# Patient Record
Sex: FEMALE | Race: OTHER | NOT HISPANIC OR LATINO | URBAN - METROPOLITAN AREA
[De-identification: names, ages, dates, MRNs, and addresses within clinical notes are randomized per-mention and may not be internally consistent; named-entity substitution may affect disease eponyms.]

---

## 2017-01-03 ENCOUNTER — EMERGENCY (EMERGENCY)
Facility: HOSPITAL | Age: 52
LOS: 1 days | Discharge: HOME | End: 2017-01-03
Admitting: EMERGENCY MEDICINE
Payer: MEDICAID

## 2017-01-03 VITALS
OXYGEN SATURATION: 100 % | SYSTOLIC BLOOD PRESSURE: 98 MMHG | HEART RATE: 80 BPM | TEMPERATURE: 98 F | RESPIRATION RATE: 16 BRPM | DIASTOLIC BLOOD PRESSURE: 58 MMHG

## 2017-01-03 VITALS
OXYGEN SATURATION: 99 % | HEART RATE: 89 BPM | TEMPERATURE: 98 F | SYSTOLIC BLOOD PRESSURE: 95 MMHG | RESPIRATION RATE: 18 BRPM | DIASTOLIC BLOOD PRESSURE: 63 MMHG

## 2017-01-03 DIAGNOSIS — F43.24 ADJUSTMENT DISORDER WITH DISTURBANCE OF CONDUCT: ICD-10-CM

## 2017-01-03 DIAGNOSIS — F33.40 MAJOR DEPRESSIVE DISORDER, RECURRENT, IN REMISSION, UNSPECIFIED: ICD-10-CM

## 2017-01-03 DIAGNOSIS — F41.9 ANXIETY DISORDER, UNSPECIFIED: ICD-10-CM

## 2017-01-03 DIAGNOSIS — Z98.89 OTHER SPECIFIED POSTPROCEDURAL STATES: Chronic | ICD-10-CM

## 2017-01-03 DIAGNOSIS — R69 ILLNESS, UNSPECIFIED: ICD-10-CM

## 2017-01-03 PROCEDURE — 90792 PSYCH DIAG EVAL W/MED SRVCS: CPT

## 2017-01-03 PROCEDURE — 99284 EMERGENCY DEPT VISIT MOD MDM: CPT

## 2017-01-03 RX ORDER — IBUPROFEN 200 MG
600 TABLET ORAL ONCE
Qty: 0 | Refills: 0 | Status: COMPLETED | OUTPATIENT
Start: 2017-01-03 | End: 2017-01-03

## 2017-01-03 RX ADMIN — Medication 600 MILLIGRAM(S): at 14:30

## 2017-01-03 NOTE — ED PROVIDER NOTE - OBJECTIVE STATEMENT
This is a 51 year old Female BIBA from Dentist office for psych eval r/t increased agitation. Patient reports jaw pain since Saturday treating with Tylenol and Motrin and went to the dentist office and got upset in which they sent here. Requesting Toradol and morphine for pain.  Reports she has an infection and needs fluids and blood drawn,  Reports thyroid biopsy two weeks ago. results benign. Denies chest pain, SOB, N/V/D and fevers, Denies palpitations or diaphoresis. Denies Numbness, Tingling, Blurry Vision and HA.  Denies suicidal/homicidal thoughts. Denies visual/auditory hallucinations. Denies ETOH/Illicit drug use. Denies recent falls, trauma and injuries. Denies pain or any other medical complaints.

## 2017-01-03 NOTE — ED ADULT NURSE NOTE - PSH
History of tonsillectomy    S/P breast lumpectomy  L breast with lymph node removal  S/P hysterectomy

## 2017-01-03 NOTE — ED BEHAVIORAL HEALTH NOTE - BEHAVIORAL HEALTH NOTE
Pt requested team call her Dentist, Dr. Mcneil [phone] for collateral as pt was sent from Dentist's office. SW called Dr. Mcneil's office and there was no answer. LISSETTE then called pt's PMD Dr. Bloom [phone] for additional information. Dr. Bloom stated that pt has been under his care for the past 10 years. Dr. Bloom stated that pt refuses to see Psychiatrist and therefore he prescribes her medications of Klonopin 1mg QD, and Paxil 30mg QD. Dr. Bloom stated that pt has many psychiatric issues, though he is unaware of any past psychiatric hospitalizations or suicide attempts/suicidality. Dr. Bloom stated that pt's baseline is highly anxious with odd behaviors, though no examples were provided, and stated pt may have unspecified psychotic d/o, though he stated he is unsure of additional dx. Per Dr. Bloom, pt resides in supportive housing, though he is unsure of location. Dr. Bloom reported that pt currently has dx of breast ca though pt has refused tx.     LISSETTE spoke to treating NP Olivia Moreira who stated that pt is now stable for d/c and requires transportation home, pt reportedly safe to travel via [employer] service. LISSETTE called "[employer]  and arranged transportation via [employer] [facility], trip #[id number] ETA 515pm.

## 2017-01-03 NOTE — ED PROVIDER NOTE - PROGRESS NOTE DETAILS
dental called for consultation.  Per dental patient has a craked tooth Patient to follow up with dental outpatient no further reccomendations at this time.

## 2017-01-03 NOTE — ED PROVIDER NOTE - MEDICAL DECISION MAKING DETAILS
This is a 51 year old Female BIBA from Dentist office for psych eval r/t increased agitation.  Medical evaluation performed. There is no clinical evidence of intoxication or any acute medical problem requiring immediate intervention. Final disposition will be determined by psychiatrist. This is a 51 year old Female BIBA from Dentist office for psych eval r/t increased agitation.  Medical evaluation performed. There is no clinical evidence of intoxication or any acute medical problem requiring immediate intervention. Final disposition will be determined by psychiatrist. Istop # [id number] no data Dental  consult revealed cracked tooth patient to follow up outpatient no recommendations

## 2017-01-03 NOTE — ED PROVIDER NOTE - PHYSICAL EXAMINATION
VSS DENIES PAIN SOB N/V NO S/S OF CARDIAC OR RESPIRATORY DISTRESS PT AMBULATES AND PERFORMS ADL'S INDEPENDENTLY TOLERATING PO INTAKE

## 2017-01-03 NOTE — ED ADULT NURSE NOTE - OBJECTIVE STATEMENT
Patient presented to ED with c/o tooth pain and gum infection, states she went to see dentist, and wanted her regular dentist, she was not pleased and became agitated in dental office, and sent to  ED for evaluation.  Patient admits to depression hx and takes Prozac daily and compliant.  Calm and cooperative.  Will continue to monitor patient closely.  Mary Jo VILLATORO (meal relief)

## 2017-01-03 NOTE — ED BEHAVIORAL HEALTH ASSESSMENT NOTE - SUMMARY
Patient is a 51 year old single unemployed non-caregiver  female currently residing in a private residence alone with HHA 2x weekly. PPH per patient MDD & Anxiety. She denies history of inpatient admissions. She has a history of 1 past suicide attempts in 2013. She denies history of violence, aggression, legal issues or substance abuse problems. PMH includes- Breast Cancer BRCA-1 in RM, Epilepsy, COPD, Pre-diabetic, migraines, bilateral calcification of kidneys and asthma. BIBA for agitation at dentists office.    Patient reports she came to the ER after she became upset when her oral surgeon was not available today so the office referred her to the ER. Patient endorses tooth pain since Saturday and is requesting a dentist. Patient was calm and cooperative during evaluation. She presented with a  linear thought process and organized. She denied SI/HI/SIB/intent/plan, depression, manic symptoms, psychotic symptoms or any other mental health issues. She was future oriented and able to safety plan. She is not seeking inpatient psychiatric admission and is requesting discharge home and to see a dentist. Patient does not present an imminent risk to self or others and does not meet criteria for involuntary inpatient admission. Recommend discharge home. Patient refused referral for psychiatric treatment stating she wants to continue to see her PCP. Extensive safety planning performed. Patient agreeing verbally to return patient to ER or call 911 if symptoms worsen or patient has urges to harm self or others.

## 2017-01-03 NOTE — ED PROVIDER NOTE - ENMT, MLM
Airway patent, Nasal mucosa clear. Mouth with normal mucosa. Throat has no vesicles, no oropharyngeal exudates and uvula is midline. Airway patent, Nasal mucosa clear. Mouth with normal mucosa. Throat has no vesicles, no oropharyngeal exudates and uvula is midline. No pain on palpation to mandible No erythema No pharyngeal irritation. No loose teeth No ulcers upon gum line noted.

## 2017-01-03 NOTE — ED BEHAVIORAL HEALTH ASSESSMENT NOTE - AXIS III
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

## 2017-01-03 NOTE — ED BEHAVIORAL HEALTH ASSESSMENT NOTE - DESCRIPTION
During course of hospitalization patient was calm and cooperative. Patient was not aggressive or violent and did not require PRN medications. Breast Cancer BRACA-1 in RM, Epilepsy, COPD, Pre-diabetic, migraines, bilateral calcification of kidneys and asthma. see hpi Breast Cancer BRCA-1 in RM, Epilepsy, COPD, Pre-diabetic, migraines, bilateral calcification of kidneys and asthma.

## 2017-01-03 NOTE — ED BEHAVIORAL HEALTH ASSESSMENT NOTE - HPI (INCLUDE ILLNESS QUALITY, SEVERITY, DURATION, TIMING, CONTEXT, MODIFYING FACTORS, ASSOCIATED SIGNS AND SYMPTOMS)
Patient is a Patient is a 51 year old single unemployed non-caregiver  female currently residing in a private residence alone with HHA 2x weekly. PPH per patient MDD & Anxiety. She denies history of inpatient admissions. She has a history of 1 past suicide attempts in 2013. She denies history of violence, aggression, legal issues or substance abuse problems. PMH includes- Breast Cancer BRACA-1 in , Epilepsy, COPD, Pre-diabetic, migraines, bilateral calcification of kidneys and asthma. BIBA for agitation at dentists office.    Patient reports she came to the ER because her oral surgeon wasn't in the office today and she became upset when the office suggested she come to the ER. Patient reports she has been having tooth pain since Saturday and thinks she needs a tooth pulled. She stated when she found out her oral surgeon wasn't in the office the office did not give her another MD to see and just suggested she come to the ER. She didn't want to go to the ER so she began yelling and they called 911.    Patient endorses chronic issues with fatigue and sleep since 2014 when she was diagnosed with breast cancer. She denies worsening of symptoms. She denies SI/HI/SIB/intent/plan, depression, anxiety, panic attacks, hypomanic or manic symptoms, hopelessness, worthlessness, drug use, AH/VH, paranoia, IOR or any other mental health issues.     See  note for collateral information. Patient is a 51 year old single unemployed non-caregiver  female currently residing in a private residence alone with HHA 2x weekly. PPH per patient MDD & Anxiety. She denies history of inpatient admissions. She has a history of 1 past suicide attempts in 2013. She denies history of violence, aggression, legal issues or substance abuse problems. PMH includes- Breast Cancer BRCA-1 in RM, Epilepsy, COPD, Pre-diabetic, migraines, bilateral calcification of kidneys and asthma. BIBA for agitation at dentists office.    Patient reports she came to the ER because her oral surgeon wasn't in the office today and she became upset when the office suggested she come to the ER. Patient reports she has been having tooth pain since Saturday and thinks she needs a tooth pulled. She stated when she found out her oral surgeon wasn't in the office the office did not give her another MD to see and just suggested she come to the ER. She didn't want to go to the ER so she began yelling and they called 911.    Patient endorses chronic issues with fatigue and sleep since 2014 when she was diagnosed with breast cancer. She denies worsening of symptoms. She denies SI/HI/SIB/intent/plan, depression, anxiety, panic attacks, hypomanic or manic symptoms, hopelessness, worthlessness, drug use, AH/VH, paranoia, IOR or any other mental health issues.     See  note for collateral information. Patient reports she had no other collateral to contact.

## 2017-01-03 NOTE — ED BEHAVIORAL HEALTH ASSESSMENT NOTE - SUICIDE PROTECTIVE FACTORS
Positive therapeutic relationships/Future oriented/Identifies reasons for living/Responsibility to family and others

## 2017-01-03 NOTE — ED BEHAVIORAL HEALTH ASSESSMENT NOTE - OTHER PAST PSYCHIATRIC HISTORY (INCLUDE DETAILS REGARDING ONSET, COURSE OF ILLNESS, INPATIENT/OUTPATIENT TREATMENT)
PPH per patient MDD & Anxiety. She denies history of inpatient admissions. She has a history of 1 past suicide attempts in 2013. Sees Internist Dr. oRbin Bloom DO for medication.

## 2017-01-03 NOTE — ED ADULT NURSE NOTE - PMH
Anxiety    Breast cancer  s/p radiation and chemo in 4/2015  COPD (chronic obstructive pulmonary disease)    Depression    Epilepsy    GERD (gastroesophageal reflux disease)

## 2017-01-03 NOTE — ED BEHAVIORAL HEALTH ASSESSMENT NOTE - CASE SUMMARY
51 year old F with history of anxiety/depression with 1 past suicide attempt who had tooth pain.  When her dentist was not there today, she caused a scene due to her behavior.  She was de-escalated by the time she arrived in ED.  She is without any SI or aggression in ED that would necessitate inpatient admission.  She does appear to be somewhat disorganized and possibly has an underlying psychotic or bipolar spectrum disorder.  She would certainly benefit from an outpt provider but refused a referral.  She will continue to follow-up with PCP and her  at [facility].

## 2017-01-03 NOTE — ED BEHAVIORAL HEALTH ASSESSMENT NOTE - RISK ASSESSMENT
patient does not present an imminent risk to self or others as evidence by no suicidal thoughts/intent/plan, no homicidal thoughts, no SIB, no depression, no reena, no psychosis, no substance abuse, medication compliant, no trauma, future oriented, able to safety plan, no anxiety, no panic attacks, no aggression/violence, no legal issues and calm/cooperative throughout evaluation.     chronic risk factors- chronic issues with insomnia and fatigue since 2014, history of suicide attempts     risk factors- acute tooth pain

## 2017-01-03 NOTE — ED BEHAVIORAL HEALTH ASSESSMENT NOTE - SAFETY PLAN DETAILS
Extensive safety planning performed. Patient agreeing verbally to return patient to ER or call 911 if symptoms worsen or patient has urges to harm self or others.

## 2017-01-03 NOTE — ED ADULT TRIAGE NOTE - CHIEF COMPLAINT QUOTE
Patient brought to ER from dentist by EMS because she got upset and was acting loud and irrational because her doctor was not there. But her chief complaint is tooth pain.

## 2017-01-05 ENCOUNTER — EMERGENCY (EMERGENCY)
Facility: HOSPITAL | Age: 52
LOS: 1 days | Discharge: HOME | End: 2017-01-05
Attending: EMERGENCY MEDICINE | Admitting: EMERGENCY MEDICINE
Payer: MEDICAID

## 2017-01-05 VITALS
OXYGEN SATURATION: 97 % | SYSTOLIC BLOOD PRESSURE: 99 MMHG | HEART RATE: 86 BPM | DIASTOLIC BLOOD PRESSURE: 76 MMHG | RESPIRATION RATE: 19 BRPM

## 2017-01-05 DIAGNOSIS — Z98.89 OTHER SPECIFIED POSTPROCEDURAL STATES: Chronic | ICD-10-CM

## 2017-01-05 PROCEDURE — 99283 EMERGENCY DEPT VISIT LOW MDM: CPT

## 2017-01-05 RX ORDER — ONDANSETRON 8 MG/1
4 TABLET, FILM COATED ORAL ONCE
Qty: 0 | Refills: 0 | Status: COMPLETED | OUTPATIENT
Start: 2017-01-05 | End: 2017-01-05

## 2017-01-05 RX ORDER — IBUPROFEN 200 MG
1 TABLET ORAL
Qty: 16 | Refills: 0 | OUTPATIENT
Start: 2017-01-05 | End: 2017-01-09

## 2017-01-05 RX ADMIN — ONDANSETRON 4 MILLIGRAM(S): 8 TABLET, FILM COATED ORAL at 11:36

## 2017-01-05 RX ADMIN — ONDANSETRON 4 MILLIGRAM(S): 8 TABLET, FILM COATED ORAL at 11:37

## 2017-01-05 NOTE — ED ADULT TRIAGE NOTE - CHIEF COMPLAINT QUOTE
Patient brought to ER from home for c/o dental pain and lethargic. Pt was here the other day for psych and said she was supposed to be seen by medical.

## 2017-01-05 NOTE — ED PROVIDER NOTE - OBJECTIVE STATEMENT
50 y/o F with PMHx of COPD, epilepsy, breast cancer s/p radiation and chemotherapy in 2015, presents to the ED via EMS c/o dental pain x5 days. Pt was seen here 1/3/2017 for similar symptoms, was sent here from dental office due to increased agitation in the office. Pt was seen in  before she was discharged, and she is here today for tooth pain to right lower molar, lethargy. Also complaining of increased bowel movements after she eats x3 days, nausea. Denies chest pain, shortness of breath.

## 2017-01-05 NOTE — ED PROVIDER NOTE - MEDICAL DECISION MAKING DETAILS
50 y/o F with dental pain. Plan: dental consult. 50 y/o F with dental pain. Plan: dental consult. refer to progress note

## 2017-01-05 NOTE — PROVIDER CONTACT NOTE (OTHER) - ASSESSMENT
pt stable to return home with her HHA.  [employer] [facility] called and transport set up for [employer] at 1:30pm. ref# [id number] pt stable to return home with her HHA.  [facility]. ref# [id number]

## 2017-01-05 NOTE — ED PROVIDER NOTE - NS ED MD SCRIBE ATTENDING SCRIBE SECTIONS
HISTORY OF PRESENT ILLNESS/REVIEW OF SYSTEMS/PAST MEDICAL/SURGICAL/SOCIAL HISTORY/DISPOSITION/HIV/VITAL SIGNS( Pullset)/PHYSICAL EXAM

## 2017-01-05 NOTE — ED PROVIDER NOTE - PROGRESS NOTE DETAILS
took over care from Dr. Han; pt primary complaint is pericorinitis; dental resident spoke to pt via phone (she had seen here on thursday) and given phone number to call for follow up; sent rx for pain meds and abx; pt seems content with this plan; has had mild diarrhea but no abdominal pain on exam; will d/c home

## 2017-04-27 ENCOUNTER — OUTPATIENT (OUTPATIENT)
Dept: OUTPATIENT SERVICES | Facility: HOSPITAL | Age: 52
LOS: 1 days | Discharge: HOME | End: 2017-04-27

## 2017-04-27 DIAGNOSIS — Z98.89 OTHER SPECIFIED POSTPROCEDURAL STATES: Chronic | ICD-10-CM

## 2017-06-28 DIAGNOSIS — R31.9 HEMATURIA, UNSPECIFIED: ICD-10-CM

## 2017-10-02 ENCOUNTER — EMERGENCY (EMERGENCY)
Facility: HOSPITAL | Age: 52
LOS: 1 days | End: 2017-10-02
Attending: EMERGENCY MEDICINE | Admitting: EMERGENCY MEDICINE
Payer: MEDICAID

## 2017-10-02 ENCOUNTER — EMERGENCY (EMERGENCY)
Facility: HOSPITAL | Age: 52
LOS: 1 days | Discharge: HOME | End: 2017-10-02
Attending: EMERGENCY MEDICINE | Admitting: EMERGENCY MEDICINE
Payer: MEDICAID

## 2017-10-02 VITALS
HEART RATE: 70 BPM | OXYGEN SATURATION: 96 % | SYSTOLIC BLOOD PRESSURE: 117 MMHG | DIASTOLIC BLOOD PRESSURE: 66 MMHG | RESPIRATION RATE: 18 BRPM

## 2017-10-02 VITALS
DIASTOLIC BLOOD PRESSURE: 59 MMHG | OXYGEN SATURATION: 100 % | SYSTOLIC BLOOD PRESSURE: 109 MMHG | TEMPERATURE: 98 F | RESPIRATION RATE: 20 BRPM | HEART RATE: 78 BPM

## 2017-10-02 VITALS
HEART RATE: 76 BPM | TEMPERATURE: 98 F | DIASTOLIC BLOOD PRESSURE: 60 MMHG | OXYGEN SATURATION: 100 % | SYSTOLIC BLOOD PRESSURE: 96 MMHG | RESPIRATION RATE: 18 BRPM

## 2017-10-02 DIAGNOSIS — Z98.89 OTHER SPECIFIED POSTPROCEDURAL STATES: Chronic | ICD-10-CM

## 2017-10-02 DIAGNOSIS — F41.1 GENERALIZED ANXIETY DISORDER: ICD-10-CM

## 2017-10-02 LAB
ALBUMIN SERPL ELPH-MCNC: 4 G/DL — SIGNIFICANT CHANGE UP (ref 3.3–5)
ALP SERPL-CCNC: 63 U/L — SIGNIFICANT CHANGE UP (ref 40–120)
ALT FLD-CCNC: 12 U/L RC — SIGNIFICANT CHANGE UP (ref 10–45)
ANION GAP SERPL CALC-SCNC: 14 MMOL/L — SIGNIFICANT CHANGE UP (ref 5–17)
APAP SERPL-MCNC: <15 UG/ML — SIGNIFICANT CHANGE UP (ref 10–30)
AST SERPL-CCNC: 17 U/L — SIGNIFICANT CHANGE UP (ref 10–40)
BASOPHILS # BLD AUTO: 0 K/UL — SIGNIFICANT CHANGE UP (ref 0–0.2)
BASOPHILS NFR BLD AUTO: 0.1 % — SIGNIFICANT CHANGE UP (ref 0–2)
BILIRUB SERPL-MCNC: 0.3 MG/DL — SIGNIFICANT CHANGE UP (ref 0.2–1.2)
BUN SERPL-MCNC: 23 MG/DL — SIGNIFICANT CHANGE UP (ref 7–23)
CALCIUM SERPL-MCNC: 9 MG/DL — SIGNIFICANT CHANGE UP (ref 8.4–10.5)
CHLORIDE SERPL-SCNC: 108 MMOL/L — SIGNIFICANT CHANGE UP (ref 96–108)
CO2 SERPL-SCNC: 19 MMOL/L — LOW (ref 22–31)
CREAT SERPL-MCNC: 0.87 MG/DL — SIGNIFICANT CHANGE UP (ref 0.5–1.3)
EOSINOPHIL # BLD AUTO: 0.1 K/UL — SIGNIFICANT CHANGE UP (ref 0–0.5)
EOSINOPHIL NFR BLD AUTO: 1.8 % — SIGNIFICANT CHANGE UP (ref 0–6)
GAS PNL BLDV: SIGNIFICANT CHANGE UP
GGT SERPL-CCNC: 44 U/L — HIGH (ref 8–40)
GLUCOSE SERPL-MCNC: 176 MG/DL — HIGH (ref 70–99)
HCG SERPL-ACNC: <2 MIU/ML — SIGNIFICANT CHANGE UP
HCT VFR BLD CALC: 43.7 % — SIGNIFICANT CHANGE UP (ref 34.5–45)
HGB BLD-MCNC: 14.6 G/DL — SIGNIFICANT CHANGE UP (ref 11.5–15.5)
LYMPHOCYTES # BLD AUTO: 1.2 K/UL — SIGNIFICANT CHANGE UP (ref 1–3.3)
LYMPHOCYTES # BLD AUTO: 19.1 % — SIGNIFICANT CHANGE UP (ref 13–44)
MCHC RBC-ENTMCNC: 32.9 PG — SIGNIFICANT CHANGE UP (ref 27–34)
MCHC RBC-ENTMCNC: 33.3 GM/DL — SIGNIFICANT CHANGE UP (ref 32–36)
MCV RBC AUTO: 98.9 FL — SIGNIFICANT CHANGE UP (ref 80–100)
MONOCYTES # BLD AUTO: 0.4 K/UL — SIGNIFICANT CHANGE UP (ref 0–0.9)
MONOCYTES NFR BLD AUTO: 5.5 % — SIGNIFICANT CHANGE UP (ref 2–14)
NEUTROPHILS # BLD AUTO: 4.7 K/UL — SIGNIFICANT CHANGE UP (ref 1.8–7.4)
NEUTROPHILS NFR BLD AUTO: 73.4 % — SIGNIFICANT CHANGE UP (ref 43–77)
PLATELET # BLD AUTO: 284 K/UL — SIGNIFICANT CHANGE UP (ref 150–400)
POTASSIUM SERPL-MCNC: 3.3 MMOL/L — LOW (ref 3.5–5.3)
POTASSIUM SERPL-SCNC: 3.3 MMOL/L — LOW (ref 3.5–5.3)
PROT SERPL-MCNC: 6.9 G/DL — SIGNIFICANT CHANGE UP (ref 6–8.3)
RBC # BLD: 4.42 M/UL — SIGNIFICANT CHANGE UP (ref 3.8–5.2)
RBC # FLD: 12.1 % — SIGNIFICANT CHANGE UP (ref 10.3–14.5)
SALICYLATES SERPL-MCNC: <2 MG/DL — LOW (ref 15–30)
SODIUM SERPL-SCNC: 141 MMOL/L — SIGNIFICANT CHANGE UP (ref 135–145)
TSH SERPL-MCNC: 1.22 UIU/ML — SIGNIFICANT CHANGE UP (ref 0.27–4.2)
WBC # BLD: 6.4 K/UL — SIGNIFICANT CHANGE UP (ref 3.8–10.5)
WBC # FLD AUTO: 6.4 K/UL — SIGNIFICANT CHANGE UP (ref 3.8–10.5)

## 2017-10-02 PROCEDURE — 82435 ASSAY OF BLOOD CHLORIDE: CPT

## 2017-10-02 PROCEDURE — 99284 EMERGENCY DEPT VISIT MOD MDM: CPT

## 2017-10-02 PROCEDURE — 84443 ASSAY THYROID STIM HORMONE: CPT

## 2017-10-02 PROCEDURE — 93010 ELECTROCARDIOGRAM REPORT: CPT

## 2017-10-02 PROCEDURE — 99285 EMERGENCY DEPT VISIT HI MDM: CPT | Mod: 25

## 2017-10-02 PROCEDURE — 82330 ASSAY OF CALCIUM: CPT

## 2017-10-02 PROCEDURE — 84132 ASSAY OF SERUM POTASSIUM: CPT

## 2017-10-02 PROCEDURE — 90792 PSYCH DIAG EVAL W/MED SRVCS: CPT

## 2017-10-02 PROCEDURE — 84702 CHORIONIC GONADOTROPIN TEST: CPT

## 2017-10-02 PROCEDURE — 93005 ELECTROCARDIOGRAM TRACING: CPT

## 2017-10-02 PROCEDURE — 82977 ASSAY OF GGT: CPT

## 2017-10-02 PROCEDURE — 83605 ASSAY OF LACTIC ACID: CPT

## 2017-10-02 PROCEDURE — 71045 X-RAY EXAM CHEST 1 VIEW: CPT

## 2017-10-02 PROCEDURE — 71010: CPT | Mod: 26

## 2017-10-02 PROCEDURE — 99284 EMERGENCY DEPT VISIT MOD MDM: CPT | Mod: 25

## 2017-10-02 PROCEDURE — 80307 DRUG TEST PRSMV CHEM ANLYZR: CPT

## 2017-10-02 PROCEDURE — 84295 ASSAY OF SERUM SODIUM: CPT

## 2017-10-02 PROCEDURE — 85027 COMPLETE CBC AUTOMATED: CPT

## 2017-10-02 PROCEDURE — 85014 HEMATOCRIT: CPT

## 2017-10-02 PROCEDURE — 80053 COMPREHEN METABOLIC PANEL: CPT

## 2017-10-02 PROCEDURE — 82947 ASSAY GLUCOSE BLOOD QUANT: CPT

## 2017-10-02 PROCEDURE — 82803 BLOOD GASES ANY COMBINATION: CPT

## 2017-10-02 RX ORDER — SODIUM CHLORIDE 9 MG/ML
1000 INJECTION INTRAMUSCULAR; INTRAVENOUS; SUBCUTANEOUS ONCE
Qty: 0 | Refills: 0 | Status: COMPLETED | OUTPATIENT
Start: 2017-10-02 | End: 2017-10-02

## 2017-10-02 RX ORDER — ACETAMINOPHEN 500 MG
650 TABLET ORAL ONCE
Qty: 0 | Refills: 0 | Status: COMPLETED | OUTPATIENT
Start: 2017-10-02 | End: 2017-10-02

## 2017-10-02 RX ADMIN — Medication 1 MILLIGRAM(S): at 13:26

## 2017-10-02 RX ADMIN — SODIUM CHLORIDE 33.33 MILLILITER(S): 9 INJECTION INTRAMUSCULAR; INTRAVENOUS; SUBCUTANEOUS at 08:52

## 2017-10-02 NOTE — ED BEHAVIORAL HEALTH ASSESSMENT NOTE - DESCRIPTION
Unremarkable. Patient seen comfortably resting on the bed in no visible distress. PMHx of seizures and breast cancer (BRCA 1 positive) Domiciled, unmarried
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
## 2017-10-02 NOTE — ED PROVIDER NOTE - PHYSICAL EXAMINATION
Physical Exam: middle aged F who is disheveled, anxious-appearing, AAO, NCAT, MMM, neck is supple, PERRL, CTAB, normal rate and regular rhythm, abdomen is soft and NTND, No edema, No deformity of extremities, No rashes, CN grossly intact, No focal motor or sensory deficits. ~ Azar Ordonez MD

## 2017-10-02 NOTE — ED PROVIDER NOTE - OBJECTIVE STATEMENT
51yo woman PMH breast CA stage 2 dx in 2014, BRCA 1 s/p chemo/radi in 2015, epilepsy last seizure "years ago", COPD, current smoker. Pt slept for 2 days, last well on Saturday, woke up this morning w/ chills, feeling unwell, short of breath, abd pain. Denies dysuria, cough, sputum production, n/v/d. Repeatedly states she "doesn't known what happened." Does not live with anyone.

## 2017-10-02 NOTE — ED BEHAVIORAL HEALTH ASSESSMENT NOTE - SUMMARY
Pt is a 51 y/o female with PMHx of seizures and breast cancer presented to the ED after recently being discharged for feeling unwell. Patient reports that she feels SOB and feels like she is going to have a seizure because "she can't stop shaking". Psychiatry was consulted for anxiety. Patient displays tangential and circumstantial thinking with possible poor self-care, but does not display an imminent threat to self and others (including inability to care for herself) and does not warrant inpatient admission at this time.

## 2017-10-02 NOTE — ED PROVIDER NOTE - ATTENDING CONTRIBUTION TO CARE
Dr. Rouse : I have personally seen and examined this patient at the bedside. I have fully participated in the care of this patient. I have reviewed all pertinent clinical information, including history, physical exam, plan and the Resident's note and agree except as noted.   53yo F hx of anxiety, depression copd breast ca, seizures  presents stating that she feels nervous that she will have a seizure, notes that she is very anxious and needs her meds (paxil, ativan).   Denies f/c/n/v/cp/sob/palpitations/cough/abd.pain/d/c/dysuria/hematuria. no sick contacts/recent travel. no hx of dvt/pe. pt was seen earlier today for the same complaint was dced as she was walking out pt got nervous and came back to the ED.    PE:  head; atraumatic normocephalic  eyes: perrla  Heart: rrr s1s2  lungs: ctab  abd: soft, nt nd + bs no rebound/guarding no cva ttp  le: no swelling no calf ttp  neuro: no focal neuro deficit cn ii-xii intact normal gait  back: no midline cervical/thoracic/lumbar ttp    -->most likely anxiety will fu ekg; labs earlier today cbc bmp wnl--will consult psych reassess

## 2017-10-02 NOTE — ED PROVIDER NOTE - NS ED ROS FT
No fever, no chills, no change in vision, no change in hearing, no chest pain, + shortness of breath, no abdominal pain, no vomiting, no dysuria, no muscle pain, no rashes, no loss of consciousness. ~ Azar Ordonez MD

## 2017-10-02 NOTE — ED PROVIDER NOTE - ATTENDING CONTRIBUTION TO CARE
Patient very poor historian.  Presenting to ED reporting "I slept from Saturday until today" and "I thought I was going to have a seizure".  I tried numerous times to clarify what she means by that and she repeatedly responded "I don't know".  Denying pain to me.  Reporting that she does not work and lives by herself.    On exam patient with odd affect, tachypenic, anxious appearing, RRR S1/S2, lungs clear, abdomen soft, non tender, non distended, normal neurologic exam.    Cause of symptoms unclear, ? possible psychiatric disease, normal neurologic exam and poor historian, plan for screening labs, psychiatry evaluation.

## 2017-10-02 NOTE — ED PROVIDER NOTE - OBJECTIVE STATEMENT
Azar Ordonez MD (resident): 52 F who was seen several hours ago in the same ED for feeling unwell. Patient reports that she feels SOB and feels like she is going to have a seizure because "she can't stop shaking". Her last seizure was "years ago." Patient denies any SI, HI, hallucinations. Pt denies psych history, and states she gets her psychiatric medications from her PMD. Pt denies any chest pain, or pain w/ deep breathing, leg swelling. Patient reports that she feels the same as when she was evaluated prior, no change in symptoms.

## 2017-10-02 NOTE — ED BEHAVIORAL HEALTH ASSESSMENT NOTE - REFERRAL / APPOINTMENT DETAILS
Patient can go to [facility] walk-in at [facility] in case of re-emergence of sx. [phone].

## 2017-10-02 NOTE — ED ADULT NURSE NOTE - OBJECTIVE STATEMENT
Received pt c/o not feeling well. Pt stated she feels she is going to have a seizure. Pt stated she has been sleeping too much. No distress. Breathing easy and non labored. Able to move all extremities. Pt very anxious. Emotional support offered.

## 2017-10-02 NOTE — ED BEHAVIORAL HEALTH NOTE - BEHAVIORAL HEALTH NOTE
Pt was received around 1220 PM in room CC28/. Pt is a 52 year old female who was just recently discharged from the ED. Pt stated she was in the waiting room and she started feeling it again. Pt received ativan 1 mg po  at 1226 pm after she was evaluated by MD Pt denied any suicidal or homicidal ideation at this time/ Pt stated she has history of seizures but reported no recent episode of same. Pt reports compliance with meds and no outpatient psychiatrist. Pt denied suicidal ideation, suicidal, or homicidal ideations at this time. Pt calmed down after about 20 minutes of ativan 1 mg administration.

## 2017-10-06 ENCOUNTER — OUTPATIENT (OUTPATIENT)
Dept: OUTPATIENT SERVICES | Facility: HOSPITAL | Age: 52
LOS: 1 days | Discharge: HOME | End: 2017-10-06

## 2017-10-06 DIAGNOSIS — Z98.89 OTHER SPECIFIED POSTPROCEDURAL STATES: Chronic | ICD-10-CM

## 2017-10-06 DIAGNOSIS — E78.00 PURE HYPERCHOLESTEROLEMIA, UNSPECIFIED: ICD-10-CM

## 2017-10-06 DIAGNOSIS — F41.1 GENERALIZED ANXIETY DISORDER: ICD-10-CM

## 2018-02-16 ENCOUNTER — OUTPATIENT (OUTPATIENT)
Dept: OUTPATIENT SERVICES | Facility: HOSPITAL | Age: 53
LOS: 1 days | Discharge: HOME | End: 2018-02-16

## 2018-02-16 DIAGNOSIS — F41.1 GENERALIZED ANXIETY DISORDER: ICD-10-CM

## 2018-02-16 DIAGNOSIS — Z98.89 OTHER SPECIFIED POSTPROCEDURAL STATES: Chronic | ICD-10-CM

## 2018-02-20 ENCOUNTER — EMERGENCY (EMERGENCY)
Facility: HOSPITAL | Age: 53
LOS: 1 days | Discharge: HOME | End: 2018-02-20
Attending: EMERGENCY MEDICINE
Payer: MEDICAID

## 2018-02-20 VITALS
HEART RATE: 72 BPM | OXYGEN SATURATION: 99 % | DIASTOLIC BLOOD PRESSURE: 74 MMHG | WEIGHT: 134.92 LBS | SYSTOLIC BLOOD PRESSURE: 105 MMHG | RESPIRATION RATE: 18 BRPM | HEIGHT: 63 IN | TEMPERATURE: 98 F

## 2018-02-20 DIAGNOSIS — Z98.89 OTHER SPECIFIED POSTPROCEDURAL STATES: Chronic | ICD-10-CM

## 2018-02-20 LAB
ALBUMIN SERPL ELPH-MCNC: 3.3 G/DL — LOW (ref 3.5–5)
ALP SERPL-CCNC: 73 U/L — SIGNIFICANT CHANGE UP (ref 40–120)
ALT FLD-CCNC: 14 U/L DA — SIGNIFICANT CHANGE UP (ref 10–60)
ANION GAP SERPL CALC-SCNC: 8 MMOL/L — SIGNIFICANT CHANGE UP (ref 5–17)
APPEARANCE UR: CLEAR — SIGNIFICANT CHANGE UP
AST SERPL-CCNC: 14 U/L — SIGNIFICANT CHANGE UP (ref 10–40)
BACTERIA # UR AUTO: ABNORMAL /HPF
BASOPHILS # BLD AUTO: 0.1 K/UL — SIGNIFICANT CHANGE UP (ref 0–0.2)
BASOPHILS NFR BLD AUTO: 1.1 % — SIGNIFICANT CHANGE UP (ref 0–2)
BILIRUB SERPL-MCNC: 0.1 MG/DL — LOW (ref 0.2–1.2)
BILIRUB UR-MCNC: NEGATIVE — SIGNIFICANT CHANGE UP
BUN SERPL-MCNC: 8 MG/DL — SIGNIFICANT CHANGE UP (ref 7–18)
CALCIUM SERPL-MCNC: 8.4 MG/DL — SIGNIFICANT CHANGE UP (ref 8.4–10.5)
CHLORIDE SERPL-SCNC: 109 MMOL/L — HIGH (ref 96–108)
CO2 SERPL-SCNC: 24 MMOL/L — SIGNIFICANT CHANGE UP (ref 22–31)
COLOR SPEC: YELLOW — SIGNIFICANT CHANGE UP
CREAT SERPL-MCNC: 0.82 MG/DL — SIGNIFICANT CHANGE UP (ref 0.5–1.3)
DIFF PNL FLD: ABNORMAL
EOSINOPHIL # BLD AUTO: 0.3 K/UL — SIGNIFICANT CHANGE UP (ref 0–0.5)
EOSINOPHIL NFR BLD AUTO: 5.7 % — SIGNIFICANT CHANGE UP (ref 0–6)
EPI CELLS # UR: SIGNIFICANT CHANGE UP /HPF
GLUCOSE SERPL-MCNC: 98 MG/DL — SIGNIFICANT CHANGE UP (ref 70–99)
GLUCOSE UR QL: NEGATIVE — SIGNIFICANT CHANGE UP
HCG UR QL: NEGATIVE — SIGNIFICANT CHANGE UP
HCT VFR BLD CALC: 40.2 % — SIGNIFICANT CHANGE UP (ref 34.5–45)
HGB BLD-MCNC: 13.2 G/DL — SIGNIFICANT CHANGE UP (ref 11.5–15.5)
KETONES UR-MCNC: NEGATIVE — SIGNIFICANT CHANGE UP
LEUKOCYTE ESTERASE UR-ACNC: ABNORMAL
LYMPHOCYTES # BLD AUTO: 2.1 K/UL — SIGNIFICANT CHANGE UP (ref 1–3.3)
LYMPHOCYTES # BLD AUTO: 37 % — SIGNIFICANT CHANGE UP (ref 13–44)
MCHC RBC-ENTMCNC: 32.1 PG — SIGNIFICANT CHANGE UP (ref 27–34)
MCHC RBC-ENTMCNC: 32.9 GM/DL — SIGNIFICANT CHANGE UP (ref 32–36)
MCV RBC AUTO: 97.5 FL — SIGNIFICANT CHANGE UP (ref 80–100)
MONOCYTES # BLD AUTO: 0.3 K/UL — SIGNIFICANT CHANGE UP (ref 0–0.9)
MONOCYTES NFR BLD AUTO: 4.5 % — SIGNIFICANT CHANGE UP (ref 2–14)
NEUTROPHILS # BLD AUTO: 2.9 K/UL — SIGNIFICANT CHANGE UP (ref 1.8–7.4)
NEUTROPHILS NFR BLD AUTO: 51.6 % — SIGNIFICANT CHANGE UP (ref 43–77)
NITRITE UR-MCNC: NEGATIVE — SIGNIFICANT CHANGE UP
PH UR: 7 — SIGNIFICANT CHANGE UP (ref 5–8)
PLATELET # BLD AUTO: 336 K/UL — SIGNIFICANT CHANGE UP (ref 150–400)
POTASSIUM SERPL-MCNC: 3.8 MMOL/L — SIGNIFICANT CHANGE UP (ref 3.5–5.3)
POTASSIUM SERPL-SCNC: 3.8 MMOL/L — SIGNIFICANT CHANGE UP (ref 3.5–5.3)
PROT SERPL-MCNC: 6.8 G/DL — SIGNIFICANT CHANGE UP (ref 6–8.3)
PROT UR-MCNC: NEGATIVE — SIGNIFICANT CHANGE UP
RBC # BLD: 4.12 M/UL — SIGNIFICANT CHANGE UP (ref 3.8–5.2)
RBC # FLD: 13.6 % — SIGNIFICANT CHANGE UP (ref 10.3–14.5)
RBC CASTS # UR COMP ASSIST: SIGNIFICANT CHANGE UP /HPF (ref 0–2)
SODIUM SERPL-SCNC: 141 MMOL/L — SIGNIFICANT CHANGE UP (ref 135–145)
SP GR SPEC: 1.01 — SIGNIFICANT CHANGE UP (ref 1.01–1.02)
UROBILINOGEN FLD QL: NEGATIVE — SIGNIFICANT CHANGE UP
WBC # BLD: 5.7 K/UL — SIGNIFICANT CHANGE UP (ref 3.8–10.5)
WBC # FLD AUTO: 5.7 K/UL — SIGNIFICANT CHANGE UP (ref 3.8–10.5)
WBC UR QL: ABNORMAL /HPF (ref 0–5)

## 2018-02-20 PROCEDURE — 96375 TX/PRO/DX INJ NEW DRUG ADDON: CPT

## 2018-02-20 PROCEDURE — 74176 CT ABD & PELVIS W/O CONTRAST: CPT

## 2018-02-20 PROCEDURE — 99284 EMERGENCY DEPT VISIT MOD MDM: CPT | Mod: 25

## 2018-02-20 PROCEDURE — 96374 THER/PROPH/DIAG INJ IV PUSH: CPT

## 2018-02-20 PROCEDURE — 96376 TX/PRO/DX INJ SAME DRUG ADON: CPT

## 2018-02-20 PROCEDURE — 99285 EMERGENCY DEPT VISIT HI MDM: CPT

## 2018-02-20 PROCEDURE — 74176 CT ABD & PELVIS W/O CONTRAST: CPT | Mod: 26

## 2018-02-20 PROCEDURE — 85027 COMPLETE CBC AUTOMATED: CPT

## 2018-02-20 PROCEDURE — 80053 COMPREHEN METABOLIC PANEL: CPT

## 2018-02-20 PROCEDURE — 81001 URINALYSIS AUTO W/SCOPE: CPT

## 2018-02-20 PROCEDURE — 81025 URINE PREGNANCY TEST: CPT

## 2018-02-20 PROCEDURE — 74019 RADEX ABDOMEN 2 VIEWS: CPT

## 2018-02-20 PROCEDURE — 74019 RADEX ABDOMEN 2 VIEWS: CPT | Mod: 26

## 2018-02-20 RX ORDER — ONDANSETRON 8 MG/1
4 TABLET, FILM COATED ORAL ONCE
Qty: 0 | Refills: 0 | Status: COMPLETED | OUTPATIENT
Start: 2018-02-20 | End: 2018-02-20

## 2018-02-20 RX ORDER — MORPHINE SULFATE 50 MG/1
4 CAPSULE, EXTENDED RELEASE ORAL ONCE
Qty: 0 | Refills: 0 | Status: DISCONTINUED | OUTPATIENT
Start: 2018-02-20 | End: 2018-02-20

## 2018-02-20 RX ADMIN — ONDANSETRON 4 MILLIGRAM(S): 8 TABLET, FILM COATED ORAL at 21:47

## 2018-02-20 RX ADMIN — MORPHINE SULFATE 4 MILLIGRAM(S): 50 CAPSULE, EXTENDED RELEASE ORAL at 22:08

## 2018-02-20 RX ADMIN — MORPHINE SULFATE 4 MILLIGRAM(S): 50 CAPSULE, EXTENDED RELEASE ORAL at 13:58

## 2018-02-20 RX ADMIN — ONDANSETRON 4 MILLIGRAM(S): 8 TABLET, FILM COATED ORAL at 13:58

## 2018-02-20 RX ADMIN — MORPHINE SULFATE 4 MILLIGRAM(S): 50 CAPSULE, EXTENDED RELEASE ORAL at 21:47

## 2018-02-20 RX ADMIN — MORPHINE SULFATE 4 MILLIGRAM(S): 50 CAPSULE, EXTENDED RELEASE ORAL at 14:28

## 2018-02-20 NOTE — ED PROVIDER NOTE - MEDICAL DECISION MAKING DETAILS
53 y/o F pt with h/o constipation presents with abd pain and nausea. Will obtain CT, labs and reassess.

## 2018-02-20 NOTE — ED ADULT NURSE NOTE - OBJECTIVE STATEMENT
Pt c/o abd pain, stated no BM x 1 week, takes oxycodone at home  Morphine 4 mg  given for c/o pain, pt requested 8 mg, stated 4 mg is not enough

## 2018-02-20 NOTE — ED PROVIDER NOTE - PMH
Anxiety    Asthma    Breast cancer  s/p radiation and chemo in 4/2015  Constipation    COPD (chronic obstructive pulmonary disease)    Depression    Epilepsy    GERD (gastroesophageal reflux disease)    Kidney stones

## 2018-02-20 NOTE — ED PROVIDER NOTE - PROGRESS NOTE DETAILS
Pt's abdominal X-ray with constipation but no air-fluid levels or free air.  Reassessment shows Pt still with significant abdominal pain and increased abdominal tenderness, no vomiting, still no BM.  She requests pain and nausea medication again.  Will add CT at this point. Pt's abdominal X-ray with constipation but no air-fluid levels or free air.  Reassessment shows Pt still with significant abdominal pain and increased abdominal tenderness, no vomiting, still no BM.  She requests pain and nausea medication again.  Will add CT at this point.  Pt endorsed to Dr. Mcnamara for continuation of care. PARVEEN: I was signed out this pt pending CT abd for abd pain. CT is unremarkable. Pt pain improved. Will send home Rx for motrin per pt request. Advise pt to f/u with PMD or return to ED PRN.

## 2018-02-20 NOTE — ED PROVIDER NOTE - OBJECTIVE STATEMENT
53 y/o F pt with PMHx of anxiety, asthma, L breat CA with h/o radiation and chemo, constipation (on Dulcolax), COPD, epilepsy, GERD, and kidney stones and no h/o abd surgeries presents to ED c/o generalized abd pain, constipation (last BM 6 days ago) and nausea x 2 days. Pt reports taking ibuprofen with no symptomatic relief. Pt denies fever, chills, dysuria, or any other complaints. ALLERGIES: as listed below.

## 2018-02-21 VITALS
HEART RATE: 83 BPM | RESPIRATION RATE: 18 BRPM | TEMPERATURE: 97 F | OXYGEN SATURATION: 98 % | SYSTOLIC BLOOD PRESSURE: 100 MMHG | DIASTOLIC BLOOD PRESSURE: 75 MMHG

## 2018-02-21 RX ORDER — IBUPROFEN 200 MG
1 TABLET ORAL
Qty: 20 | Refills: 0 | OUTPATIENT
Start: 2018-02-21 | End: 2018-02-25

## 2018-05-24 ENCOUNTER — EMERGENCY (EMERGENCY)
Facility: HOSPITAL | Age: 53
LOS: 1 days | Discharge: HOME | End: 2018-05-24
Attending: EMERGENCY MEDICINE | Admitting: EMERGENCY MEDICINE
Payer: MEDICAID

## 2018-05-24 VITALS
SYSTOLIC BLOOD PRESSURE: 122 MMHG | TEMPERATURE: 99 F | HEART RATE: 94 BPM | RESPIRATION RATE: 16 BRPM | OXYGEN SATURATION: 95 % | DIASTOLIC BLOOD PRESSURE: 84 MMHG

## 2018-05-24 VITALS — TEMPERATURE: 98 F

## 2018-05-24 DIAGNOSIS — Z98.89 OTHER SPECIFIED POSTPROCEDURAL STATES: Chronic | ICD-10-CM

## 2018-05-24 DIAGNOSIS — F32.9 MAJOR DEPRESSIVE DISORDER, SINGLE EPISODE, UNSPECIFIED: ICD-10-CM

## 2018-05-24 DIAGNOSIS — R44.2 OTHER HALLUCINATIONS: ICD-10-CM

## 2018-05-24 DIAGNOSIS — F41.9 ANXIETY DISORDER, UNSPECIFIED: ICD-10-CM

## 2018-05-24 LAB
ALBUMIN SERPL ELPH-MCNC: 4.5 G/DL — SIGNIFICANT CHANGE UP (ref 3.3–5)
ALP SERPL-CCNC: 89 U/L — SIGNIFICANT CHANGE UP (ref 40–120)
ALT FLD-CCNC: 33 U/L — SIGNIFICANT CHANGE UP (ref 4–33)
APPEARANCE UR: CLEAR — SIGNIFICANT CHANGE UP
AST SERPL-CCNC: 19 U/L — SIGNIFICANT CHANGE UP (ref 4–32)
BASOPHILS # BLD AUTO: 0.03 K/UL — SIGNIFICANT CHANGE UP (ref 0–0.2)
BASOPHILS NFR BLD AUTO: 0.4 % — SIGNIFICANT CHANGE UP (ref 0–2)
BILIRUB SERPL-MCNC: 0.3 MG/DL — SIGNIFICANT CHANGE UP (ref 0.2–1.2)
BILIRUB UR-MCNC: NEGATIVE — SIGNIFICANT CHANGE UP
BLOOD UR QL VISUAL: HIGH
BUN SERPL-MCNC: 13 MG/DL — SIGNIFICANT CHANGE UP (ref 7–23)
CALCIUM SERPL-MCNC: 8.9 MG/DL — SIGNIFICANT CHANGE UP (ref 8.4–10.5)
CHLORIDE SERPL-SCNC: 102 MMOL/L — SIGNIFICANT CHANGE UP (ref 98–107)
CO2 SERPL-SCNC: 22 MMOL/L — SIGNIFICANT CHANGE UP (ref 22–31)
COLOR SPEC: SIGNIFICANT CHANGE UP
CREAT SERPL-MCNC: 1.11 MG/DL — SIGNIFICANT CHANGE UP (ref 0.5–1.3)
EOSINOPHIL # BLD AUTO: 0.58 K/UL — HIGH (ref 0–0.5)
EOSINOPHIL NFR BLD AUTO: 7.7 % — HIGH (ref 0–6)
GLUCOSE SERPL-MCNC: 88 MG/DL — SIGNIFICANT CHANGE UP (ref 70–99)
GLUCOSE UR-MCNC: NEGATIVE — SIGNIFICANT CHANGE UP
HCG SERPL-ACNC: < 5 MIU/ML — SIGNIFICANT CHANGE UP
HCT VFR BLD CALC: 41.8 % — SIGNIFICANT CHANGE UP (ref 34.5–45)
HGB BLD-MCNC: 13.7 G/DL — SIGNIFICANT CHANGE UP (ref 11.5–15.5)
IMM GRANULOCYTES # BLD AUTO: 0.02 # — SIGNIFICANT CHANGE UP
IMM GRANULOCYTES NFR BLD AUTO: 0.3 % — SIGNIFICANT CHANGE UP (ref 0–1.5)
KETONES UR-MCNC: NEGATIVE — SIGNIFICANT CHANGE UP
LEUKOCYTE ESTERASE UR-ACNC: SIGNIFICANT CHANGE UP
LYMPHOCYTES # BLD AUTO: 2.68 K/UL — SIGNIFICANT CHANGE UP (ref 1–3.3)
LYMPHOCYTES # BLD AUTO: 35.6 % — SIGNIFICANT CHANGE UP (ref 13–44)
MCHC RBC-ENTMCNC: 30 PG — SIGNIFICANT CHANGE UP (ref 27–34)
MCHC RBC-ENTMCNC: 32.8 % — SIGNIFICANT CHANGE UP (ref 32–36)
MCV RBC AUTO: 91.7 FL — SIGNIFICANT CHANGE UP (ref 80–100)
MONOCYTES # BLD AUTO: 0.58 K/UL — SIGNIFICANT CHANGE UP (ref 0–0.9)
MONOCYTES NFR BLD AUTO: 7.7 % — SIGNIFICANT CHANGE UP (ref 2–14)
MUCOUS THREADS # UR AUTO: SIGNIFICANT CHANGE UP
NEUTROPHILS # BLD AUTO: 3.63 K/UL — SIGNIFICANT CHANGE UP (ref 1.8–7.4)
NEUTROPHILS NFR BLD AUTO: 48.3 % — SIGNIFICANT CHANGE UP (ref 43–77)
NITRITE UR-MCNC: NEGATIVE — SIGNIFICANT CHANGE UP
NRBC # FLD: 0 — SIGNIFICANT CHANGE UP
PH UR: 6 — SIGNIFICANT CHANGE UP (ref 4.6–8)
PLATELET # BLD AUTO: 278 K/UL — SIGNIFICANT CHANGE UP (ref 150–400)
PMV BLD: 9.5 FL — SIGNIFICANT CHANGE UP (ref 7–13)
POTASSIUM SERPL-MCNC: 3.3 MMOL/L — LOW (ref 3.5–5.3)
POTASSIUM SERPL-SCNC: 3.3 MMOL/L — LOW (ref 3.5–5.3)
PROT SERPL-MCNC: 7.6 G/DL — SIGNIFICANT CHANGE UP (ref 6–8.3)
PROT UR-MCNC: NEGATIVE MG/DL — SIGNIFICANT CHANGE UP
RBC # BLD: 4.56 M/UL — SIGNIFICANT CHANGE UP (ref 3.8–5.2)
RBC # FLD: 13.6 % — SIGNIFICANT CHANGE UP (ref 10.3–14.5)
RBC CASTS # UR COMP ASSIST: SIGNIFICANT CHANGE UP (ref 0–?)
SODIUM SERPL-SCNC: 139 MMOL/L — SIGNIFICANT CHANGE UP (ref 135–145)
SP GR SPEC: 1.01 — SIGNIFICANT CHANGE UP (ref 1–1.04)
SQUAMOUS # UR AUTO: SIGNIFICANT CHANGE UP
UROBILINOGEN FLD QL: NORMAL MG/DL — SIGNIFICANT CHANGE UP
WBC # BLD: 7.52 K/UL — SIGNIFICANT CHANGE UP (ref 3.8–10.5)
WBC # FLD AUTO: 7.52 K/UL — SIGNIFICANT CHANGE UP (ref 3.8–10.5)
WBC UR QL: SIGNIFICANT CHANGE UP (ref 0–?)

## 2018-05-24 PROCEDURE — 71046 X-RAY EXAM CHEST 2 VIEWS: CPT | Mod: 26

## 2018-05-24 PROCEDURE — 99284 EMERGENCY DEPT VISIT MOD MDM: CPT | Mod: 25

## 2018-05-24 PROCEDURE — 70450 CT HEAD/BRAIN W/O DYE: CPT | Mod: 26

## 2018-05-24 PROCEDURE — 90792 PSYCH DIAG EVAL W/MED SRVCS: CPT

## 2018-05-24 RX ORDER — POTASSIUM CHLORIDE 20 MEQ
40 PACKET (EA) ORAL ONCE
Qty: 0 | Refills: 0 | Status: COMPLETED | OUTPATIENT
Start: 2018-05-24 | End: 2018-05-24

## 2018-05-24 RX ORDER — ACETAMINOPHEN 500 MG
650 TABLET ORAL ONCE
Qty: 0 | Refills: 0 | Status: COMPLETED | OUTPATIENT
Start: 2018-05-24 | End: 2018-05-24

## 2018-05-24 RX ADMIN — Medication 40 MILLIEQUIVALENT(S): at 11:42

## 2018-05-24 NOTE — ED PROVIDER NOTE - PROGRESS NOTE DETAILS
ED Attending Dr. Pope: labs and imaging results reviewed--mild hypokalemia noted and orally supplemented, otherwise labs without results requiring emergent intervention, WBC WNL; CXR and CT head without acute findings; pt sleeping comfortably at time of my re-eval and repeat vitals reassuring, now s/p acetaminophen; pt does not appear ill or toxic and will be given strict return precautions for worsening fever, pain, or other complaints.  Otherwise pt has a PMD with whom she appears capable of following up with.  Pt medically clear. ED Attending Dr. Pope: initial plan was to await UA results to see if pt required abx for UTI (in the setting of possible fever)--clarified with nurse and pt did NOT receive acetaminophen in ED, refused to take, so repeat vitals improved without any ED intervention; spoke with pt and she does not want to wait for UA results, denies urinary complaints, states she will not take any antibiotics anyway because they exacerbate her asthma; although I do not agree with patient's decision to leave before urine is resulted, patient appears to understand the risk and is in her right mind to make this decision; will discharge with strict return precautions

## 2018-05-24 NOTE — ED BEHAVIORAL HEALTH NOTE - BEHAVIORAL HEALTH NOTE
Writer was informed patient was medically and psychiatrically cleared for discharge.  Dellr met with patient who requested taxi home.  Writer discussed outpatient referral which patient agreed to.  Dellr emailed [facility] requesting an urgent appointment and was informed there are no more  appointments and patient should go to walk in clinic.  Dellr provided patient with brochure for Crisis Clinic.  Dellr called [facility] [phone] and spoke to Katerina who arranged a taxi with [employer] with transport within 20 minutes using confirmation #[id number].

## 2018-05-24 NOTE — ED PROVIDER NOTE - OBJECTIVE STATEMENT
52 yo female with anxiety, depression on paroxetine prescribed by her neurologist, breast cancer s/p treatment, COPD, seizure disorder on topirimate, GERD, called EMS herself this morning because she thought she saw her  sitting on her couch in her apartment, and then heart a cat in her apartment.  Pt states that she has a  for "housing" and was not supposed to see her in the home this morning.  When EMS arrived the  was not in the home and there was no cat in the home.  Pt became anxious/agitated and so EMS was called.  Pt denies any acute complaints of CP/SOB, N/V/D or abdominal pain.  No SI/HI.  Pt states that she took her normal morning medications but nothing else. 52 yo female with anxiety, depression on paroxetine prescribed by her neurologist, breast cancer s/p treatment, COPD, seizure disorder on topirimate, GERD, called EMS herself this morning because she thought she saw her  sitting on her couch in her apartment, and then heart a cat in her apartment.  Pt states that she has a  for "housing" and was not supposed to see her in the home this morning.  When EMS arrived the  was not in the home and there was no cat in the home.  Pt became anxious/agitated and so EMS was called.  Pt denies any acute complaints of CP/SOB, N/V/D or abdominal pain.  No SI/HI.  Pt states that she took her normal morning medications but nothing else.  Oral temp 99.4 in triage but pt denies any infectious symptoms and does not meet sepsis criteria.

## 2018-05-24 NOTE — ED BEHAVIORAL HEALTH ASSESSMENT NOTE - OTHER PAST PSYCHIATRIC HISTORY (INCLUDE DETAILS REGARDING ONSET, COURSE OF ILLNESS, INPATIENT/OUTPATIENT TREATMENT)
PPH per patient MDD & Anxiety. She denies history of inpatient admissions. She has a history of 1 past suicide attempts in 2013. Sees Internist Dr. Robin Bloom DO for medication.     No psychiatrist/Therapist

## 2018-05-24 NOTE — ED BEHAVIORAL HEALTH ASSESSMENT NOTE - DETAILS
see hpi; patient does not remember method of suicide attempt in 2013 self referred endorses back/breast pain- informed Dr. Pope

## 2018-05-24 NOTE — ED BEHAVIORAL HEALTH ASSESSMENT NOTE - DESCRIPTION
During course of hospitalization patient was calm and cooperative. Patient was not aggressive or violent and did not require PRN medications. 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 see hpi During course of hospitalization patient was calm and cooperative. Patient was not aggressive or violent and did not require PRN medications.    Vital Signs Last 24 Hrs  T(C): 37.4 (24 May 2018 07:19), Max: 37.4 (24 May 2018 07:19)  T(F): 99.4 (24 May 2018 07:19), Max: 99.4 (24 May 2018 07:19)  HR: 94 (24 May 2018 07:19) (94 - 94)  BP: 122/84 (24 May 2018 07:19) (122/84 - 122/84)  BP(mean): --  RR: 16 (24 May 2018 07:19) (16 - 16)  SpO2: 95% (24 May 2018 07:19) (95% - 95%)

## 2018-05-24 NOTE — ED ADULT NURSE NOTE - CHIEF COMPLAINT QUOTE
pt called ems to remove "2 adults, 2 children and cat from the closet." as per ems, pt was the only one in the house. pt denies si/hi, auditory hallucinations, etoh and drug use. pt hyperverbal in triage. unknown psych hx. endorses left back pain x few days. hx breast ca, left arm precaution. md alston aware, pt to be seen in .

## 2018-05-24 NOTE — ED BEHAVIORAL HEALTH ASSESSMENT NOTE - SUMMARY
Patient is a 53 year old single unemployed non-caregiver  female currently residing in a private residence. PPH per patient MDD & Anxiety. She endorses history of 1 past inpatient admission many years ago. She has a history of 1 past suicide attempts in 2013. She denies history of violence, aggression, legal issues. Endorses history of social alcohol /MJ use and cocaine use 30+ years ago. No recent substance abuse. No history of rehab/detox/withdrawal symptoms/DTs or withdrawal seizures. PMH includes- Breast Cancer BRCA-1 in RM, Epilepsy, COPD, Pre-diabetic, migraines, bilateral calcification of kidneys and asthma. BIBA for hallucination.     Patient presents to the ER in the context of possible hallucination. She reports waking up and seeing her /someone else on her couch so she called 911. She is able to reality test understanding it is unlikely her  or anyone else was in her home and that this incident may be related to her grogginess upon awakening and hyponopompic hallucinations.     She denied SI/HI/SIB/intent/plan, depression, manic symptoms, other psychotic symptoms or any other mental health issues. She was future oriented and able to safety plan. She does not present acutely psychotic or disorganized. She is not seeking and refused inpatient psychiatric admission and is requesting discharge home. Patient does not present an imminent risk to self or others and does not meet criteria for involuntary inpatient admission. Recommend discharge home. Patient refused referral for psychiatric treatment stating she wants to continue to see her PCP; given [facility] crisis clinic information. Extensive safety planning performed. Patient agreeing verbally to return patient to ER or call 911 if symptoms worsen or patient has urges to harm self or others. Patient is a 53 year old single unemployed non-caregiver  female currently residing in a private residence. PPH per patient MDD & Anxiety. She endorses history of 1 past inpatient admission many years ago. She has a history of 1 past suicide attempts in 2013. She denies history of violence, aggression, legal issues. Endorses history of social alcohol /MJ use and cocaine use 30+ years ago. No recent substance abuse. No history of rehab/detox/withdrawal symptoms/DTs or withdrawal seizures. PMH includes- Breast Cancer BRCA-1 in RM, Epilepsy, COPD, Pre-diabetic, migraines, bilateral calcification of kidneys and asthma. BIBA for hallucination.     Patient presents to the ER in the context of possible hallucination. She reports waking up and seeing her /someone else on her couch so she called 911. She is able to reality test understanding it is unlikely her  or anyone else was in her home and that this incident may be related to her grogginess upon awakening and hyponopompic hallucinations.     She denied SI/HI/SIB/intent/plan, depression, manic symptoms, other psychotic symptoms or any other mental health issues. She was future oriented and able to safety plan. She does not present acutely psychotic or disorganized. She is not seeking and refused inpatient psychiatric admission and is requesting discharge home. Patient does not present an imminent risk to self or others and does not meet criteria for involuntary inpatient admission. Recommend discharge home. Patient agreeing to  referral; patient can also continue with PCP until appointment. Extensive safety planning performed. Patient agreeing verbally to return patient to ER or call 911 if symptoms worsen or patient has urges to harm self or others. Patient is a 53 year old single unemployed non-caregiver  female currently residing in a private residence. PPH per patient MDD & Anxiety. She endorses history of 1 past inpatient admission many years ago. She has a history of 1 past suicide attempts in 2013. She denies history of violence, aggression, legal issues. Endorses history of social alcohol /MJ use and cocaine use 30+ years ago. No recent substance abuse. No history of rehab/detox/withdrawal symptoms/DTs or withdrawal seizures. PMH includes- Breast Cancer BRCA-1 in RM, Epilepsy, COPD, Pre-diabetic, migraines, bilateral calcification of kidneys and asthma. BIBA for hallucination.     Patient presents to the ER in the context of possible hallucination. She reports waking up and seeing her /someone else on her couch so she called 911. She is able to reality test understanding it is unlikely her  or anyone else was in her home and that this incident may be related to her grogginess upon awakening and hyponopompic hallucinations.     She denied SI/HI/SIB/intent/plan, depression, manic symptoms, other psychotic symptoms or any other mental health issues. She was future oriented and able to safety plan. She does not present acutely psychotic or disorganized. She is not seeking and refused inpatient psychiatric admission and is requesting discharge home. Patient does not present an imminent risk to self or others and does not meet criteria for involuntary inpatient admission. Recommend discharge home. SW made  referral- no appointments available (see  note) patient being referred to [facility] walk in- crisis clinic. Extensive safety planning performed. Patient agreeing verbally to return patient to ER or call 911 if symptoms worsen or patient has urges to harm self or others. Patient is a 53 year old single unemployed non-caregiver  female currently residing in a private residence. PPHx per patient MDD & Anxiety. She endorses history of 1 past inpatient admission many years ago. She has a history of 1 past suicide attempt in 2013. She denies history of violence, aggression, legal issues. Endorses history of social alcohol /MJ use and cocaine use 30+ years ago. No recent substance abuse. No history of rehab/detox/withdrawal symptoms/DTs or withdrawal seizures. PMH includes- Breast Cancer BRCA-1 in RM, Epilepsy, COPD, Pre-diabetic, migraines, bilateral calcification of kidneys and asthma. BIBA for hallucination.     Patient presents to the ER in the context of possible hallucination. She reports waking up and seeing her /someone else on her couch so she called 911. She is able to reality test understanding it is unlikely her  or anyone else was in her home and that this incident may be related to her grogginess upon awakening and hyponopompic hallucinations.     She denied SI/HI/SIB/intent/plan, depression, manic symptoms, other psychotic symptoms or any other mental health issues. She was future oriented and able to safety plan. She does not present acutely psychotic or disorganized. She is not seeking and refused inpatient psychiatric admission and is requesting discharge home. Patient does not present an imminent risk to self or others and does not meet criteria for involuntary inpatient admission. Recommend discharge home. LISSETTE made  referral- no appointments available (see  note) patient being referred to [facility] walk in- crisis clinic. Extensive safety planning performed. Patient agreeing verbally to return patient to ER or call 911 if symptoms worsen or patient has urges to harm self or others.

## 2018-05-24 NOTE — ED BEHAVIORAL HEALTH NOTE - BEHAVIORAL HEALTH NOTE
Writer contacted Joan Wall [phone] not easiest person to get along with, she's very vocal.  Patient only goes to a PCP for medication, but refuses a psychiatrist.  She states at baseline patient is functional, sometimes erratic calling office demanding food demanding to be seen.  Patient receives public assistance which does not provide enough food stamps so the agency provides food at times.  They are trying to obtain SSD for her, but since she does not have a psychiatrist that can Patient has pain medication seeking behaviors and is well read on medications. Patient talks about her father a lot.  Patient is able to transport via public transportation, but doesn't like to travel with other people. Patient can return via taxi if insurance will pay for it. Writer contacted Belia Michaels  [phone] who states patient is, "not easiest person to get along with, she's very vocal".  Patient only goes to a PCP for medication, but refuses a psychiatrist.  She states at baseline patient is functional, sometimes erratic calling office demanding food demanding to be seen.  Patient receives public assistance which does not provide enough food stamps so the agency provides food at times.  They are trying to obtain SSD for her, but since she does not have a psychiatrist that can complete disability paperwork, patient remains with PA only.  Patient also needs to have a psychiatric diagnosis to be in the program.  Patient has pain medication seeking behaviors and is well read on medications. Patient talks about her father a lot.  Patient is able to transport via public transportation, but doesn't like to travel with other people. Patient can return via taxi if insurance will pay for it.  Ms. michaels did not have any safety concerns.

## 2018-05-24 NOTE — ED PROVIDER NOTE - MEDICAL DECISION MAKING DETAILS
54 yo female with medical and psychiatric history as documented--brought to ED by EMS with possible audiovisual hallucinations; pt currently denying any acute complaints, no CP/SOB, N/V/D or abdominal pain; noted to have oral temp 99.4 but overall well appearing and not meeting sepsis criteria; PLAN--CT head, basic labs, UA, re-eval

## 2018-05-24 NOTE — ED ADULT NURSE NOTE - OBJECTIVE STATEMENT
Pt received to . Pt states she lives alone and she woke up this morning feeling "groggy" and that she suddenly saw 3 adults in her apartment, one of which was sleeping on her dirty laundry and one she recognised as her   ". Pt then proceeded to get up and have coffee and when she returned back to the living room the adults were gone and a "kitten and a mouse" were scurrying all over the floor causing her to call 911 and get so frightened that she "fell several times" and is now having lower back pain. Pt denies SI/HI. Pts belongings secured for safety; pt awaiting psychiatric evaluation.

## 2018-05-24 NOTE — ED ADULT TRIAGE NOTE - CHIEF COMPLAINT QUOTE
pt called ems to remove "2 adults, 2 children and cat from the closet." as per ems, pt was the only one in the house. pt denies si/hi, auditory hallucinations, etoh and drug use. pt hyperverbal in triage. unknown psych hx. endorses left back pain x few days. hx breast ca, left arm precaution pt called ems to remove "2 adults, 2 children and cat from the closet." as per ems, pt was the only one in the house. pt denies si/hi, auditory hallucinations, etoh and drug use. pt hyperverbal in triage. unknown psych hx. endorses left back pain x few days. hx breast ca, left arm precaution. md alston aware, pt to be seen in .

## 2018-05-24 NOTE — ED BEHAVIORAL HEALTH NOTE - BEHAVIORAL HEALTH NOTE
Writer called pt’s  Beliaaleksandr Ayala   x [phone] and left a voicemail requesting a callback to social work [facility].

## 2018-05-24 NOTE — ED BEHAVIORAL HEALTH ASSESSMENT NOTE - HPI (INCLUDE ILLNESS QUALITY, SEVERITY, DURATION, TIMING, CONTEXT, MODIFYING FACTORS, ASSOCIATED SIGNS AND SYMPTOMS)
Patient is a 53 year old single unemployed non-caregiver  female currently residing in a private residence. PPH per patient MDD & Anxiety. She endorses history of 1 past inpatient admission many years ago. She has a history of 1 past suicide attempts in . She denies history of violence, aggression, legal issues. Endorses history of social alcohol /MJ use and cocaine use 30+ years ago. No recent substance abuse. No history of rehab/detox/withdrawal symptoms/DTs or withdrawal seizures. PMH includes- Breast Cancer BRCA-1 in RM, Epilepsy, COPD, Pre-diabetic, migraines, bilateral calcification of kidneys and asthma. BIBA for hallucination.     Patient reports she came to the ER because she thought her  and someone else was in her apartment and wouldn't leave. She stated she woke up this AM and felt groggy. She went to the bathroom and noticed her  and someone else was sitting on her couch. She asked them to leave but they did not answer so she called 911. She reports then when EMS came no one was there. She is able to reality test understanding that it is unlikely her  was in her apartment this morning and possible that her "mind is playing tricks." She discussed a similar incident that happened many years ago after her father's   when she thought she heard him breathing after the . She denies ever having VH/AH besides these 2 incidents.        Patient endorses chronic issues with fatigue and sleep since  when she was diagnosed with breast cancer but takes PRN Benadryl which helps her when she has trouble sleeping. She reports her mood has been good and appetite is at baseline. Patient denies any hallucinations, does not report any delusional thought content, denies thought insertion/withdrawal, denies referential thought processes & is not paranoid on interview. Patient does not report nor exhibit any signs of reena, including irritable or elevated mood, grandiosity, pressured speech, risk-taking behaviors, increase in productivity or agitation. Patient denies any depressive symptoms including depressed mood, anhedonia, changes in energy/concentration/appetite, preoccupation with death or feelings of guilt. She adamantly denies SI, intent or plan; denies any HI, violent thoughts.     See  note for collateral information. Patient reports she had no other collateral to contact. Patient is a 53 year old single unemployed non-caregiver  female currently residing in a private residence. PPH per patient MDD & Anxiety. She endorses history of 1 past inpatient admission many years ago. She has a history of 1 past suicide attempts in . She denies history of violence, aggression, legal issues. Endorses history of social alcohol /MJ use and cocaine use 30+ years ago. No recent substance abuse. No history of rehab/detox/withdrawal symptoms/DTs or withdrawal seizures. PMH includes- Breast Cancer BRCA-1 in RM, Epilepsy, COPD, Pre-diabetic, migraines, bilateral calcification of kidneys and asthma. BIBA for hallucination.     Patient reports she came to the ER because she thought her  and someone else was in her apartment and wouldn't leave. She stated she woke up this AM and felt groggy. She went to the bathroom and noticed her  and someone else was sitting on her couch. She asked them to leave but they did not answer so she called 911. She reports then when EMS came no one was there. She is able to reality test understanding that it is unlikely her  was in her apartment this morning and possible that her "mind is playing tricks." She discussed a similar incident that happened many years ago after her father's   when she thought she heard him breathing after the . She denies ever having VH/AH besides these 2 incidents.        Patient endorses chronic issues with fatigue and sleep since  when she was diagnosed with breast cancer but takes PRN Benadryl which helps her when she has trouble sleeping. She reports her mood has been good and appetite is at baseline. Patient denies any current/other hallucinations, does not report any delusional thought content, denies thought insertion/withdrawal, denies referential thought processes & is not paranoid on interview. Patient does not report nor exhibit any signs of reena, including irritable or elevated mood, grandiosity, pressured speech, risk-taking behaviors, increase in productivity or agitation. Patient denies any depressive symptoms including depressed mood, anhedonia, changes in energy/concentration/appetite, preoccupation with death or feelings of guilt. She adamantly denies SI, intent or plan; denies any HI, violent thoughts.     See  note for collateral information. Patient reports she had no other collateral to contact.

## 2018-05-24 NOTE — ED BEHAVIORAL HEALTH NOTE - BEHAVIORAL HEALTH NOTE
Writer called pt’s Primary Dr. Bloom  and left a voicemail requesting a callback to social work [facility].

## 2018-05-24 NOTE — ED BEHAVIORAL HEALTH ASSESSMENT NOTE - SUICIDE PROTECTIVE FACTORS
Future oriented/Positive therapeutic relationships/Responsibility to family and others/Identifies reasons for living/Fear of death or dying due to pain/suffering

## 2018-05-24 NOTE — ED BEHAVIORAL HEALTH ASSESSMENT NOTE - RISK ASSESSMENT
patient does not present an imminent risk to self or others as evidence by no suicidal thoughts/intent/plan, no homicidal thoughts, no SIB, no depression, no reena, no substance abuse, medication compliant, no trauma, future oriented, able to safety plan, no anxiety,, no aggression/violence, no legal issues and calm/cooperative throughout evaluation.     chronic risk factors- chronic issues with insomnia and fatigue since 2014, history of suicide attempts     risk factors- chronic mental illness, history of suicide attempt in 2013, history of inpatient admission

## 2018-05-25 NOTE — ED POST DISCHARGE NOTE - ADDITIONAL DOCUMENTATION
Spoke with Vida from Dr Bloom office, PMD, and she will have dr follow up with pt regarding ua results

## 2018-06-08 ENCOUNTER — OUTPATIENT (OUTPATIENT)
Dept: OUTPATIENT SERVICES | Facility: HOSPITAL | Age: 53
LOS: 1 days | Discharge: HOME | End: 2018-06-08

## 2018-06-08 DIAGNOSIS — Z98.89 OTHER SPECIFIED POSTPROCEDURAL STATES: Chronic | ICD-10-CM

## 2018-06-08 DIAGNOSIS — R73.03 PREDIABETES: ICD-10-CM

## 2018-06-08 DIAGNOSIS — R53.83 OTHER FATIGUE: ICD-10-CM

## 2018-07-01 ENCOUNTER — OUTPATIENT (OUTPATIENT)
Dept: OUTPATIENT SERVICES | Facility: HOSPITAL | Age: 53
LOS: 1 days | End: 2018-07-01
Payer: MEDICAID

## 2018-07-01 DIAGNOSIS — Z98.89 OTHER SPECIFIED POSTPROCEDURAL STATES: Chronic | ICD-10-CM

## 2018-07-01 PROCEDURE — G9001: CPT

## 2018-07-03 ENCOUNTER — EMERGENCY (EMERGENCY)
Facility: HOSPITAL | Age: 53
LOS: 1 days | Discharge: HOME | End: 2018-07-03
Attending: EMERGENCY MEDICINE | Admitting: EMERGENCY MEDICINE
Payer: MEDICAID

## 2018-07-03 VITALS
TEMPERATURE: 98 F | HEART RATE: 67 BPM | SYSTOLIC BLOOD PRESSURE: 95 MMHG | RESPIRATION RATE: 16 BRPM | OXYGEN SATURATION: 100 % | DIASTOLIC BLOOD PRESSURE: 50 MMHG

## 2018-07-03 VITALS
HEART RATE: 62 BPM | OXYGEN SATURATION: 100 % | SYSTOLIC BLOOD PRESSURE: 95 MMHG | RESPIRATION RATE: 16 BRPM | DIASTOLIC BLOOD PRESSURE: 56 MMHG

## 2018-07-03 DIAGNOSIS — Z98.89 OTHER SPECIFIED POSTPROCEDURAL STATES: Chronic | ICD-10-CM

## 2018-07-03 LAB
ALBUMIN SERPL ELPH-MCNC: 3.4 G/DL — SIGNIFICANT CHANGE UP (ref 3.3–5)
ALP SERPL-CCNC: 62 U/L — SIGNIFICANT CHANGE UP (ref 40–120)
ALT FLD-CCNC: 17 U/L — SIGNIFICANT CHANGE UP (ref 4–33)
APAP SERPL-MCNC: < 15 UG/ML — LOW (ref 15–25)
AST SERPL-CCNC: 24 U/L — SIGNIFICANT CHANGE UP (ref 4–32)
BASOPHILS # BLD AUTO: 0.02 K/UL — SIGNIFICANT CHANGE UP (ref 0–0.2)
BASOPHILS NFR BLD AUTO: 0.3 % — SIGNIFICANT CHANGE UP (ref 0–2)
BILIRUB SERPL-MCNC: 0.4 MG/DL — SIGNIFICANT CHANGE UP (ref 0.2–1.2)
BUN SERPL-MCNC: 13 MG/DL — SIGNIFICANT CHANGE UP (ref 7–23)
CALCIUM SERPL-MCNC: 8.2 MG/DL — LOW (ref 8.4–10.5)
CHLORIDE SERPL-SCNC: 103 MMOL/L — SIGNIFICANT CHANGE UP (ref 98–107)
CO2 SERPL-SCNC: 18 MMOL/L — LOW (ref 22–31)
CREAT SERPL-MCNC: 1.19 MG/DL — SIGNIFICANT CHANGE UP (ref 0.5–1.3)
EOSINOPHIL # BLD AUTO: 0.45 K/UL — SIGNIFICANT CHANGE UP (ref 0–0.5)
EOSINOPHIL NFR BLD AUTO: 6.6 % — HIGH (ref 0–6)
ETHANOL BLD-MCNC: < 10 MG/DL — SIGNIFICANT CHANGE UP
GLUCOSE SERPL-MCNC: 83 MG/DL — SIGNIFICANT CHANGE UP (ref 70–99)
HCT VFR BLD CALC: 33.2 % — LOW (ref 34.5–45)
HGB BLD-MCNC: 11.3 G/DL — LOW (ref 11.5–15.5)
IMM GRANULOCYTES # BLD AUTO: 0.02 # — SIGNIFICANT CHANGE UP
IMM GRANULOCYTES NFR BLD AUTO: 0.3 % — SIGNIFICANT CHANGE UP (ref 0–1.5)
LYMPHOCYTES # BLD AUTO: 2.52 K/UL — SIGNIFICANT CHANGE UP (ref 1–3.3)
LYMPHOCYTES # BLD AUTO: 36.8 % — SIGNIFICANT CHANGE UP (ref 13–44)
MCHC RBC-ENTMCNC: 29.6 PG — SIGNIFICANT CHANGE UP (ref 27–34)
MCHC RBC-ENTMCNC: 34 % — SIGNIFICANT CHANGE UP (ref 32–36)
MCV RBC AUTO: 86.9 FL — SIGNIFICANT CHANGE UP (ref 80–100)
MONOCYTES # BLD AUTO: 0.54 K/UL — SIGNIFICANT CHANGE UP (ref 0–0.9)
MONOCYTES NFR BLD AUTO: 7.9 % — SIGNIFICANT CHANGE UP (ref 2–14)
NEUTROPHILS # BLD AUTO: 3.29 K/UL — SIGNIFICANT CHANGE UP (ref 1.8–7.4)
NEUTROPHILS NFR BLD AUTO: 48.1 % — SIGNIFICANT CHANGE UP (ref 43–77)
NRBC # FLD: 0 — SIGNIFICANT CHANGE UP
PLATELET # BLD AUTO: 250 K/UL — SIGNIFICANT CHANGE UP (ref 150–400)
PMV BLD: 9.5 FL — SIGNIFICANT CHANGE UP (ref 7–13)
POTASSIUM SERPL-MCNC: 3.7 MMOL/L — SIGNIFICANT CHANGE UP (ref 3.5–5.3)
POTASSIUM SERPL-SCNC: 3.7 MMOL/L — SIGNIFICANT CHANGE UP (ref 3.5–5.3)
PROT SERPL-MCNC: 6.4 G/DL — SIGNIFICANT CHANGE UP (ref 6–8.3)
RBC # BLD: 3.82 M/UL — SIGNIFICANT CHANGE UP (ref 3.8–5.2)
RBC # FLD: 14.9 % — HIGH (ref 10.3–14.5)
SALICYLATES SERPL-MCNC: < 5 MG/DL — LOW (ref 15–30)
SODIUM SERPL-SCNC: 138 MMOL/L — SIGNIFICANT CHANGE UP (ref 135–145)
WBC # BLD: 6.84 K/UL — SIGNIFICANT CHANGE UP (ref 3.8–10.5)
WBC # FLD AUTO: 6.84 K/UL — SIGNIFICANT CHANGE UP (ref 3.8–10.5)

## 2018-07-03 PROCEDURE — 99284 EMERGENCY DEPT VISIT MOD MDM: CPT

## 2018-07-03 RX ORDER — SODIUM CHLORIDE 9 MG/ML
1000 INJECTION INTRAMUSCULAR; INTRAVENOUS; SUBCUTANEOUS ONCE
Qty: 0 | Refills: 0 | Status: COMPLETED | OUTPATIENT
Start: 2018-07-03 | End: 2018-07-03

## 2018-07-03 RX ADMIN — SODIUM CHLORIDE 1000 MILLILITER(S): 9 INJECTION INTRAMUSCULAR; INTRAVENOUS; SUBCUTANEOUS at 16:22

## 2018-07-03 RX ADMIN — SODIUM CHLORIDE 1000 MILLILITER(S): 9 INJECTION INTRAMUSCULAR; INTRAVENOUS; SUBCUTANEOUS at 14:30

## 2018-07-03 NOTE — ED ADULT NURSE NOTE - OBJECTIVE STATEMENT
Break coverage:  received pt in room 1, c/o "weakness for past couple of days."  Pt sleeping in stretcher, refusing to change into gown.  Pt refusing repeat vital signs, states "I just want to sleep."  No external injuries noted.  Pt AAOx3, respirations even and unlabored.  Pt pending MD evaluation.

## 2018-07-03 NOTE — ED PROVIDER NOTE - PHYSICAL EXAMINATION
Maribel Howard M.D.:   patient seen on stretcher, minimally arousable to verbal stimuli.   LUNGS CTAB no wheeze no crackle.   CARD RRR no m/r/g.    Abdomen soft NT ND no rebound no guarding no CVA tenderness.   EXT WWP no edema no calf tenderness CV 2+DP/PT bilaterally.   neuro A&Ox3 gait normal.    skin warm and dry no rash  HEENT: moist mucous membranes, PERRL, EOMI Maribel Howard M.D.:   patient seen on stretcher,  ao3, arousable to voice  LUNGS CTAB no wheeze no crackle.   CARD RRR no m/r/g.    Abdomen soft NT ND no rebound no guarding no CVA tenderness.   EXT WWP no edema no calf tenderness CV 2+DP/PT bilaterally.   neuro A&Ox3 gait normal.    skin warm and dry no rash  HEENT: dry mucous membranes, PERRL, EOMI
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
## 2018-07-03 NOTE — ED ADULT NURSE REASSESSMENT NOTE - NS ED NURSE REASSESS COMMENT FT1
Pt agreed to change into gown for physical exam.  Labs drawn and sent; IV access obtained.  IV fluids infusing.
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
## 2018-07-12 ENCOUNTER — EMERGENCY (EMERGENCY)
Facility: HOSPITAL | Age: 53
LOS: 1 days | Discharge: HOME | End: 2018-07-12
Attending: EMERGENCY MEDICINE | Admitting: EMERGENCY MEDICINE
Payer: MEDICAID

## 2018-07-12 VITALS
DIASTOLIC BLOOD PRESSURE: 90 MMHG | OXYGEN SATURATION: 100 % | RESPIRATION RATE: 17 BRPM | SYSTOLIC BLOOD PRESSURE: 130 MMHG | TEMPERATURE: 99 F | HEART RATE: 83 BPM

## 2018-07-12 DIAGNOSIS — Z98.89 OTHER SPECIFIED POSTPROCEDURAL STATES: Chronic | ICD-10-CM

## 2018-07-12 LAB
ALBUMIN SERPL ELPH-MCNC: 4.2 G/DL — SIGNIFICANT CHANGE UP (ref 3.3–5)
ALP SERPL-CCNC: 70 U/L — SIGNIFICANT CHANGE UP (ref 40–120)
ALT FLD-CCNC: 17 U/L — SIGNIFICANT CHANGE UP (ref 4–33)
AMPHET UR-MCNC: NEGATIVE — SIGNIFICANT CHANGE UP
APAP SERPL-MCNC: < 15 UG/ML — LOW (ref 15–25)
APPEARANCE UR: CLEAR — SIGNIFICANT CHANGE UP
AST SERPL-CCNC: 21 U/L — SIGNIFICANT CHANGE UP (ref 4–32)
BARBITURATES UR SCN-MCNC: NEGATIVE — SIGNIFICANT CHANGE UP
BASOPHILS # BLD AUTO: 0.03 K/UL — SIGNIFICANT CHANGE UP (ref 0–0.2)
BASOPHILS NFR BLD AUTO: 0.6 % — SIGNIFICANT CHANGE UP (ref 0–2)
BENZODIAZ UR-MCNC: POSITIVE — SIGNIFICANT CHANGE UP
BILIRUB SERPL-MCNC: 0.2 MG/DL — SIGNIFICANT CHANGE UP (ref 0.2–1.2)
BILIRUB UR-MCNC: NEGATIVE — SIGNIFICANT CHANGE UP
BLOOD UR QL VISUAL: HIGH
BUN SERPL-MCNC: 21 MG/DL — SIGNIFICANT CHANGE UP (ref 7–23)
CALCIUM SERPL-MCNC: 9.1 MG/DL — SIGNIFICANT CHANGE UP (ref 8.4–10.5)
CANNABINOIDS UR-MCNC: NEGATIVE — SIGNIFICANT CHANGE UP
CHLORIDE SERPL-SCNC: 103 MMOL/L — SIGNIFICANT CHANGE UP (ref 98–107)
CO2 SERPL-SCNC: 23 MMOL/L — SIGNIFICANT CHANGE UP (ref 22–31)
COCAINE METAB.OTHER UR-MCNC: NEGATIVE — SIGNIFICANT CHANGE UP
COLOR SPEC: YELLOW — SIGNIFICANT CHANGE UP
CREAT SERPL-MCNC: 1.05 MG/DL — SIGNIFICANT CHANGE UP (ref 0.5–1.3)
EOSINOPHIL # BLD AUTO: 0.5 K/UL — SIGNIFICANT CHANGE UP (ref 0–0.5)
EOSINOPHIL NFR BLD AUTO: 9.3 % — HIGH (ref 0–6)
ETHANOL BLD-MCNC: < 10 MG/DL — SIGNIFICANT CHANGE UP
GLUCOSE SERPL-MCNC: 104 MG/DL — HIGH (ref 70–99)
GLUCOSE UR-MCNC: NEGATIVE — SIGNIFICANT CHANGE UP
HCT VFR BLD CALC: 39.7 % — SIGNIFICANT CHANGE UP (ref 34.5–45)
HGB BLD-MCNC: 13.2 G/DL — SIGNIFICANT CHANGE UP (ref 11.5–15.5)
IMM GRANULOCYTES # BLD AUTO: 0.03 # — SIGNIFICANT CHANGE UP
IMM GRANULOCYTES NFR BLD AUTO: 0.6 % — SIGNIFICANT CHANGE UP (ref 0–1.5)
KETONES UR-MCNC: NEGATIVE — SIGNIFICANT CHANGE UP
LEUKOCYTE ESTERASE UR-ACNC: HIGH
LIDOCAIN IGE QN: 46.8 U/L — SIGNIFICANT CHANGE UP (ref 7–60)
LYMPHOCYTES # BLD AUTO: 2.56 K/UL — SIGNIFICANT CHANGE UP (ref 1–3.3)
LYMPHOCYTES # BLD AUTO: 47.6 % — HIGH (ref 13–44)
MCHC RBC-ENTMCNC: 29.5 PG — SIGNIFICANT CHANGE UP (ref 27–34)
MCHC RBC-ENTMCNC: 33.2 % — SIGNIFICANT CHANGE UP (ref 32–36)
MCV RBC AUTO: 88.8 FL — SIGNIFICANT CHANGE UP (ref 80–100)
METHADONE UR-MCNC: NEGATIVE — SIGNIFICANT CHANGE UP
MONOCYTES # BLD AUTO: 0.51 K/UL — SIGNIFICANT CHANGE UP (ref 0–0.9)
MONOCYTES NFR BLD AUTO: 9.5 % — SIGNIFICANT CHANGE UP (ref 2–14)
MUCOUS THREADS # UR AUTO: SIGNIFICANT CHANGE UP
NEUTROPHILS # BLD AUTO: 1.75 K/UL — LOW (ref 1.8–7.4)
NEUTROPHILS NFR BLD AUTO: 32.4 % — LOW (ref 43–77)
NITRITE UR-MCNC: NEGATIVE — SIGNIFICANT CHANGE UP
NRBC # FLD: 0 — SIGNIFICANT CHANGE UP
OPIATES UR-MCNC: NEGATIVE — SIGNIFICANT CHANGE UP
OXYCODONE UR-MCNC: NEGATIVE — SIGNIFICANT CHANGE UP
PCP UR-MCNC: NEGATIVE — SIGNIFICANT CHANGE UP
PH UR: 6.5 — SIGNIFICANT CHANGE UP (ref 4.6–8)
PLATELET # BLD AUTO: 297 K/UL — SIGNIFICANT CHANGE UP (ref 150–400)
PMV BLD: 9.6 FL — SIGNIFICANT CHANGE UP (ref 7–13)
POTASSIUM SERPL-MCNC: 2.9 MMOL/L — CRITICAL LOW (ref 3.5–5.3)
POTASSIUM SERPL-SCNC: 2.9 MMOL/L — CRITICAL LOW (ref 3.5–5.3)
PROT SERPL-MCNC: 7.2 G/DL — SIGNIFICANT CHANGE UP (ref 6–8.3)
PROT UR-MCNC: 30 MG/DL — HIGH
RBC # BLD: 4.47 M/UL — SIGNIFICANT CHANGE UP (ref 3.8–5.2)
RBC # FLD: 15.7 % — HIGH (ref 10.3–14.5)
RBC CASTS # UR COMP ASSIST: HIGH (ref 0–?)
SALICYLATES SERPL-MCNC: < 5 MG/DL — LOW (ref 15–30)
SODIUM SERPL-SCNC: 140 MMOL/L — SIGNIFICANT CHANGE UP (ref 135–145)
SP GR SPEC: 1.02 — SIGNIFICANT CHANGE UP (ref 1–1.04)
SQUAMOUS # UR AUTO: SIGNIFICANT CHANGE UP
TSH SERPL-MCNC: 3.04 UIU/ML — SIGNIFICANT CHANGE UP (ref 0.27–4.2)
UROBILINOGEN FLD QL: 1 MG/DL — SIGNIFICANT CHANGE UP
WBC # BLD: 5.38 K/UL — SIGNIFICANT CHANGE UP (ref 3.8–10.5)
WBC # FLD AUTO: 5.38 K/UL — SIGNIFICANT CHANGE UP (ref 3.8–10.5)
WBC UR QL: SIGNIFICANT CHANGE UP (ref 0–?)

## 2018-07-12 PROCEDURE — 99285 EMERGENCY DEPT VISIT HI MDM: CPT | Mod: 25

## 2018-07-12 PROCEDURE — 93010 ELECTROCARDIOGRAM REPORT: CPT

## 2018-07-12 RX ORDER — CEPHALEXIN 500 MG
500 CAPSULE ORAL EVERY 12 HOURS
Qty: 0 | Refills: 0 | Status: DISCONTINUED | OUTPATIENT
Start: 2018-07-12 | End: 2018-07-12

## 2018-07-12 RX ORDER — POTASSIUM CHLORIDE 20 MEQ
40 PACKET (EA) ORAL ONCE
Qty: 0 | Refills: 0 | Status: COMPLETED | OUTPATIENT
Start: 2018-07-12 | End: 2018-07-12

## 2018-07-12 RX ORDER — FAMOTIDINE 10 MG/ML
20 INJECTION INTRAVENOUS ONCE
Qty: 0 | Refills: 0 | Status: COMPLETED | OUTPATIENT
Start: 2018-07-12 | End: 2018-07-12

## 2018-07-12 RX ORDER — ACETAMINOPHEN 500 MG
975 TABLET ORAL ONCE
Qty: 0 | Refills: 0 | Status: DISCONTINUED | OUTPATIENT
Start: 2018-07-12 | End: 2018-07-12

## 2018-07-12 RX ORDER — KETOROLAC TROMETHAMINE 30 MG/ML
15 SYRINGE (ML) INJECTION ONCE
Qty: 0 | Refills: 0 | Status: DISCONTINUED | OUTPATIENT
Start: 2018-07-12 | End: 2018-07-12

## 2018-07-12 RX ORDER — CEPHALEXIN 500 MG
500 CAPSULE ORAL ONCE
Qty: 0 | Refills: 0 | Status: COMPLETED | OUTPATIENT
Start: 2018-07-12 | End: 2018-07-12

## 2018-07-12 RX ORDER — POTASSIUM CHLORIDE 20 MEQ
10 PACKET (EA) ORAL
Qty: 0 | Refills: 0 | Status: COMPLETED | OUTPATIENT
Start: 2018-07-12 | End: 2018-07-12

## 2018-07-12 RX ADMIN — Medication 1 MILLIGRAM(S): at 16:55

## 2018-07-12 RX ADMIN — Medication 40 MILLIEQUIVALENT(S): at 20:14

## 2018-07-12 RX ADMIN — Medication 100 MILLIEQUIVALENT(S): at 23:46

## 2018-07-12 RX ADMIN — FAMOTIDINE 20 MILLIGRAM(S): 10 INJECTION INTRAVENOUS at 21:58

## 2018-07-12 RX ADMIN — Medication 100 MILLIEQUIVALENT(S): at 21:43

## 2018-07-12 RX ADMIN — Medication 15 MILLIGRAM(S): at 22:00

## 2018-07-12 RX ADMIN — Medication 500 MILLIGRAM(S): at 21:58

## 2018-07-12 RX ADMIN — Medication 100 MILLIEQUIVALENT(S): at 22:42

## 2018-07-12 NOTE — ED PROVIDER NOTE - PHYSICAL EXAMINATION
Gen: NAD, AOx3  Head: NCAT  HEENT: PERRL, oral mucosa moist, normal conjunctiva  Lung: CTAB, no respiratory distress  CV: rrr, no murmurs, Normal perfusion  Abd: soft, luq tender without guarding or rebound, mild L CVA tenderness  MSK: No edema, no visible deformities, no midline spinal tenderness, mild left lumbar muscular tenderness no redness or swelling  Neuro: No focal neurologic deficits, CN intact, motor and sensation intact  Skin: No rash   Psych: disorganized, tangential thoughts
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
## 2018-07-12 NOTE — ED PROVIDER NOTE - OBJECTIVE STATEMENT
Patient is 53 y F with PMH kidney stones, GERD, COPD, distant breast ca s/p lumpectomy, hysterectomy, seizure disorder BIBEMS presenting with 6 days of back pain/ LUQ pain, EMS reported patient was very disorganized and paranoid on arrival, complaining of seeing cheetahs. Patient is poor historian, says she does not recall what happened today, denies SI/HI, current hallucinations. States she was seen in ED last week for heat exhaustion. Current smoker, denies recent ETOH or illicit drug use, denies ingesting any substances. Feels constipated, last BM a few days ago.     PMD: can't remember  ROS: Denies fever, palpitations, chills, recent sickness, HA, vision changes, cough, SOB, chest pain, dysuria, hematuria, rash, new joint aches, sick contacts, and recent travel.

## 2018-07-12 NOTE — ED PROVIDER NOTE - PROGRESS NOTE DETAILS
VIN Dougherty is in main ed for potassium replenishment Once medically cleared to be seen by psych.  MD Janet majano aware patient in main required psych consult once cleared medically.
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
## 2018-07-12 NOTE — ED PROVIDER NOTE - CARE PLAN
Principal Discharge DX:	Hallucination  Secondary Diagnosis:	Hypokalemia  Secondary Diagnosis:	UTI (urinary tract infection)
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
## 2018-07-13 VITALS
HEART RATE: 88 BPM | DIASTOLIC BLOOD PRESSURE: 79 MMHG | OXYGEN SATURATION: 100 % | RESPIRATION RATE: 16 BRPM | TEMPERATURE: 98 F | SYSTOLIC BLOOD PRESSURE: 116 MMHG

## 2018-07-13 DIAGNOSIS — F29 UNSPECIFIED PSYCHOSIS NOT DUE TO A SUBSTANCE OR KNOWN PHYSIOLOGICAL CONDITION: ICD-10-CM

## 2018-07-13 LAB
ALBUMIN SERPL ELPH-MCNC: 3.2 G/DL — LOW (ref 3.3–5)
ALP SERPL-CCNC: 56 U/L — SIGNIFICANT CHANGE UP (ref 40–120)
ALT FLD-CCNC: 11 U/L — SIGNIFICANT CHANGE UP (ref 4–33)
AST SERPL-CCNC: 13 U/L — SIGNIFICANT CHANGE UP (ref 4–32)
BILIRUB SERPL-MCNC: < 0.2 MG/DL — LOW (ref 0.2–1.2)
BUN SERPL-MCNC: 17 MG/DL — SIGNIFICANT CHANGE UP (ref 7–23)
CALCIUM SERPL-MCNC: 8 MG/DL — LOW (ref 8.4–10.5)
CHLORIDE SERPL-SCNC: 107 MMOL/L — SIGNIFICANT CHANGE UP (ref 98–107)
CO2 SERPL-SCNC: 24 MMOL/L — SIGNIFICANT CHANGE UP (ref 22–31)
CREAT SERPL-MCNC: 0.96 MG/DL — SIGNIFICANT CHANGE UP (ref 0.5–1.3)
GLUCOSE SERPL-MCNC: 98 MG/DL — SIGNIFICANT CHANGE UP (ref 70–99)
POTASSIUM SERPL-MCNC: 3.7 MMOL/L — SIGNIFICANT CHANGE UP (ref 3.5–5.3)
POTASSIUM SERPL-SCNC: 3.7 MMOL/L — SIGNIFICANT CHANGE UP (ref 3.5–5.3)
PROT SERPL-MCNC: 5.3 G/DL — LOW (ref 6–8.3)
SODIUM SERPL-SCNC: 143 MMOL/L — SIGNIFICANT CHANGE UP (ref 135–145)

## 2018-07-13 PROCEDURE — 90792 PSYCH DIAG EVAL W/MED SRVCS: CPT

## 2018-07-13 RX ORDER — CEPHALEXIN 500 MG
1 CAPSULE ORAL
Qty: 21 | Refills: 0 | OUTPATIENT
Start: 2018-07-13 | End: 2018-07-19
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
## 2018-07-13 NOTE — ED ADULT NURSE REASSESSMENT NOTE - NS ED NURSE REASSESS COMMENT FT1
pt K+ now 3.7. MD Lamar made aware. pt to go to . report given to  RAVEN Bush. pt currently calm, cooperative. IV removed. pt taken via wheelchair by SHELBY Quigley.

## 2018-07-13 NOTE — ED BEHAVIORAL HEALTH ASSESSMENT NOTE - DESCRIPTION
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 see hpi cooperative, in good behavioral control     Vital Signs Last 24 Hrs  T(C): 36.9 (13 Jul 2018 08:51), Max: 37.1 (12 Jul 2018 16:27)  T(F): 98.4 (13 Jul 2018 08:51), Max: 98.8 (12 Jul 2018 16:27)  HR: 88 (13 Jul 2018 08:51) (62 - 88)  BP: 116/79 (13 Jul 2018 08:51) (110/58 - 130/90)  BP(mean): --  RR: 16 (13 Jul 2018 08:51) (16 - 19)  SpO2: 100% (13 Jul 2018 08:51) (100% - 100%)
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
## 2018-07-13 NOTE — ED BEHAVIORAL HEALTH ASSESSMENT NOTE - SAFETY PLAN DETAILS
Extensive safety planning performed. Patient agreeing verbally to return to ER or call 911 if symptoms worsen or patient has urges to harm self or others.

## 2018-07-13 NOTE — ED BEHAVIORAL HEALTH ASSESSMENT NOTE - PATIENT'S CHIEF COMPLAINT
"I was asleep and I felt groggy when I woke up and I saw people sitting on my couch." "I was seeing things."

## 2018-07-23 DIAGNOSIS — Z71.89 OTHER SPECIFIED COUNSELING: ICD-10-CM

## 2018-07-25 PROBLEM — J45.909 UNSPECIFIED ASTHMA, UNCOMPLICATED: Chronic | Status: ACTIVE | Noted: 2018-02-21

## 2018-07-25 PROBLEM — N20.0 CALCULUS OF KIDNEY: Chronic | Status: ACTIVE | Noted: 2018-02-21

## 2018-07-25 PROBLEM — K59.00 CONSTIPATION, UNSPECIFIED: Chronic | Status: ACTIVE | Noted: 2018-02-21

## 2018-08-01 ENCOUNTER — OUTPATIENT (OUTPATIENT)
Dept: OUTPATIENT SERVICES | Facility: HOSPITAL | Age: 53
LOS: 1 days | End: 2018-08-01

## 2018-08-01 DIAGNOSIS — Z98.89 OTHER SPECIFIED POSTPROCEDURAL STATES: Chronic | ICD-10-CM

## 2018-08-17 ENCOUNTER — EMERGENCY (EMERGENCY)
Facility: HOSPITAL | Age: 53
LOS: 1 days | Discharge: HOME | End: 2018-08-17
Admitting: EMERGENCY MEDICINE
Payer: MEDICAID

## 2018-08-17 VITALS
SYSTOLIC BLOOD PRESSURE: 120 MMHG | RESPIRATION RATE: 16 BRPM | HEART RATE: 70 BPM | TEMPERATURE: 98 F | OXYGEN SATURATION: 100 % | DIASTOLIC BLOOD PRESSURE: 81 MMHG

## 2018-08-17 VITALS
SYSTOLIC BLOOD PRESSURE: 114 MMHG | TEMPERATURE: 98 F | DIASTOLIC BLOOD PRESSURE: 62 MMHG | RESPIRATION RATE: 18 BRPM | HEART RATE: 74 BPM

## 2018-08-17 DIAGNOSIS — Z98.89 OTHER SPECIFIED POSTPROCEDURAL STATES: Chronic | ICD-10-CM

## 2018-08-17 PROCEDURE — 71110 X-RAY EXAM RIBS BIL 3 VIEWS: CPT | Mod: 26

## 2018-08-17 PROCEDURE — 99285 EMERGENCY DEPT VISIT HI MDM: CPT

## 2018-08-17 RX ORDER — TOPIRAMATE 25 MG
100 TABLET ORAL ONCE
Qty: 0 | Refills: 0 | Status: COMPLETED | OUTPATIENT
Start: 2018-08-17 | End: 2018-08-17

## 2018-08-17 RX ADMIN — Medication 2 MILLIGRAM(S): at 16:45

## 2018-08-17 RX ADMIN — Medication 2 MILLIGRAM(S): at 21:15

## 2018-08-17 RX ADMIN — Medication 100 MILLIGRAM(S): at 16:50

## 2018-08-17 NOTE — ED PROVIDER NOTE - MEDICAL DECISION MAKING DETAILS
52 y/o M hx Bipolar, Asthma, Epilepsy, COPD, GERD, Breast Cancer  EKG. Urine Tox/UA.    Medical evaluation performed. There is no clinical evidence of intoxication or any acute medical problem requiring immediate intervention. Recommend following up with [facility].  SW consulted for arranging transportation home.

## 2018-08-17 NOTE — ED BEHAVIORAL HEALTH NOTE - BEHAVIORAL HEALTH NOTE
Writer called [facility], [phone], to arrange livery service, as requested by the evaluating psychiatrist, and spoke with Suzanne, who provided invoice # [id number], for service to be provided by [employer], [phone], with an ETA of 10:30PM, to her address, verified to be [address] [place] [address], phone [phone]. Writer called [employer], [phone], and was given a possible earlier ETA of 9:30PM.

## 2018-08-17 NOTE — ED ADULT NURSE NOTE - NSIMPLEMENTINTERV_GEN_ALL_ED
Implemented All Universal Safety Interventions:  [place] to call system. Call bell, personal items and telephone within reach. Instruct patient to call for assistance. Room bathroom lighting operational. Non-slip footwear when patient is off stretcher. Physically safe environment: no spills, clutter or unnecessary equipment. Stretcher in lowest position, wheels locked, appropriate side rails in place.

## 2018-08-17 NOTE — ED ADULT TRIAGE NOTE - CHIEF COMPLAINT QUOTE
Patient has c/o palpitations and anxiety. Pt says that she is nauseous and feels like she is going to have a seizure. She states she does not want to go to psych but the patient is anxious at triage and has no s/s of seizure activity. Pt is also not on any psych meds.

## 2018-08-17 NOTE — ED ADULT NURSE REASSESSMENT NOTE - NS ED NURSE REASSESS COMMENT FT1
Break coverage note:  Pt currently calm and cooperative in Low acuity.  Pt medicated as per MAR.  Awaiting dispo.
pt calm and cooperative. ate dinner. denies any new or worsening symptoms. ambulated to bathroom. alert and oriented. awaits dispo in no distress.
pt was anxious, requesting ativan. medicated as ordered. reports therapeutic effect from med. cab arrived to take home pt. pt ambulated out of ed to cab with pca.

## 2018-08-17 NOTE — ED PROVIDER NOTE - OBJECTIVE STATEMENT
52 y/o M hx Bipolar      Denies falling, punching or kicking any objects. Denies pain, SOB, fever, chills, chest/ abdominal discomfort. Denies  SI/HI/AH/VH. Denies recent use of alcohol or illicit drugs. 52 y/o M hx Bipolar, Asthma, Epilepsy, COPD, GERD, Breast Cancer BIBA w c/o increase anxiousness , chest pain and " feeling like I'm having a seizure". States that chest pain which in now resolved lasted for a few minutes, around the mid sternum my area. Denies N/V, SOB, fever, chills,  abdominal discomfort  Denies falling, punching or kicking any objects. Denies  SI/HI/AH/VH. Admits  that she has missed her Topamax x 3 days.  Denies recent use of alcohol or illicit drugs.

## 2018-08-18 ENCOUNTER — EMERGENCY (EMERGENCY)
Facility: HOSPITAL | Age: 53
LOS: 1 days | Discharge: LEFT BEFORE DISCHARGE | End: 2018-08-18
Attending: EMERGENCY MEDICINE | Admitting: EMERGENCY MEDICINE
Payer: MEDICAID

## 2018-08-18 VITALS
SYSTOLIC BLOOD PRESSURE: 117 MMHG | RESPIRATION RATE: 20 BRPM | DIASTOLIC BLOOD PRESSURE: 74 MMHG | HEART RATE: 82 BPM | OXYGEN SATURATION: 100 %

## 2018-08-18 VITALS
OXYGEN SATURATION: 100 % | HEART RATE: 69 BPM | RESPIRATION RATE: 18 BRPM | DIASTOLIC BLOOD PRESSURE: 78 MMHG | SYSTOLIC BLOOD PRESSURE: 129 MMHG | TEMPERATURE: 98 F

## 2018-08-18 DIAGNOSIS — F41.9 ANXIETY DISORDER, UNSPECIFIED: ICD-10-CM

## 2018-08-18 DIAGNOSIS — Z98.89 OTHER SPECIFIED POSTPROCEDURAL STATES: Chronic | ICD-10-CM

## 2018-08-18 LAB
ALBUMIN SERPL ELPH-MCNC: 3.6 G/DL — SIGNIFICANT CHANGE UP (ref 3.3–5)
ALP SERPL-CCNC: 74 U/L — SIGNIFICANT CHANGE UP (ref 40–120)
ALT FLD-CCNC: 25 U/L — SIGNIFICANT CHANGE UP (ref 4–33)
APPEARANCE UR: SIGNIFICANT CHANGE UP
AST SERPL-CCNC: 36 U/L — HIGH (ref 4–32)
BACTERIA # UR AUTO: NEGATIVE — SIGNIFICANT CHANGE UP
BASOPHILS # BLD AUTO: 0.03 K/UL — SIGNIFICANT CHANGE UP (ref 0–0.2)
BASOPHILS NFR BLD AUTO: 0.4 % — SIGNIFICANT CHANGE UP (ref 0–2)
BILIRUB SERPL-MCNC: 0.4 MG/DL — SIGNIFICANT CHANGE UP (ref 0.2–1.2)
BILIRUB UR-MCNC: SIGNIFICANT CHANGE UP
BLOOD UR QL VISUAL: HIGH
BUN SERPL-MCNC: 20 MG/DL — SIGNIFICANT CHANGE UP (ref 7–23)
CALCIUM SERPL-MCNC: 8.6 MG/DL — SIGNIFICANT CHANGE UP (ref 8.4–10.5)
CHLORIDE SERPL-SCNC: 103 MMOL/L — SIGNIFICANT CHANGE UP (ref 98–107)
CO2 SERPL-SCNC: 18 MMOL/L — LOW (ref 22–31)
COD CRY URNS QL: SIGNIFICANT CHANGE UP (ref 0–0)
COLOR SPEC: YELLOW — SIGNIFICANT CHANGE UP
CREAT SERPL-MCNC: 1.02 MG/DL — SIGNIFICANT CHANGE UP (ref 0.5–1.3)
EOSINOPHIL # BLD AUTO: 0.43 K/UL — SIGNIFICANT CHANGE UP (ref 0–0.5)
EOSINOPHIL NFR BLD AUTO: 5.1 % — SIGNIFICANT CHANGE UP (ref 0–6)
GLUCOSE SERPL-MCNC: 114 MG/DL — HIGH (ref 70–99)
GLUCOSE UR-MCNC: NEGATIVE — SIGNIFICANT CHANGE UP
HCT VFR BLD CALC: 38.5 % — SIGNIFICANT CHANGE UP (ref 34.5–45)
HGB BLD-MCNC: 13.1 G/DL — SIGNIFICANT CHANGE UP (ref 11.5–15.5)
IMM GRANULOCYTES # BLD AUTO: 0.03 # — SIGNIFICANT CHANGE UP
IMM GRANULOCYTES NFR BLD AUTO: 0.4 % — SIGNIFICANT CHANGE UP (ref 0–1.5)
KETONES UR-MCNC: NEGATIVE — SIGNIFICANT CHANGE UP
LEUKOCYTE ESTERASE UR-ACNC: SIGNIFICANT CHANGE UP
LYMPHOCYTES # BLD AUTO: 2.56 K/UL — SIGNIFICANT CHANGE UP (ref 1–3.3)
LYMPHOCYTES # BLD AUTO: 30.6 % — SIGNIFICANT CHANGE UP (ref 13–44)
MCHC RBC-ENTMCNC: 30.9 PG — SIGNIFICANT CHANGE UP (ref 27–34)
MCHC RBC-ENTMCNC: 34 % — SIGNIFICANT CHANGE UP (ref 32–36)
MCV RBC AUTO: 90.8 FL — SIGNIFICANT CHANGE UP (ref 80–100)
MONOCYTES # BLD AUTO: 0.73 K/UL — SIGNIFICANT CHANGE UP (ref 0–0.9)
MONOCYTES NFR BLD AUTO: 8.7 % — SIGNIFICANT CHANGE UP (ref 2–14)
NEUTROPHILS # BLD AUTO: 4.58 K/UL — SIGNIFICANT CHANGE UP (ref 1.8–7.4)
NEUTROPHILS NFR BLD AUTO: 54.8 % — SIGNIFICANT CHANGE UP (ref 43–77)
NITRITE UR-MCNC: NEGATIVE — SIGNIFICANT CHANGE UP
NRBC # FLD: 0 — SIGNIFICANT CHANGE UP
PH UR: 6.5 — SIGNIFICANT CHANGE UP (ref 5–8)
PLATELET # BLD AUTO: 298 K/UL — SIGNIFICANT CHANGE UP (ref 150–400)
PMV BLD: 10.8 FL — SIGNIFICANT CHANGE UP (ref 7–13)
POTASSIUM SERPL-MCNC: 3.7 MMOL/L — SIGNIFICANT CHANGE UP (ref 3.5–5.3)
POTASSIUM SERPL-SCNC: 3.7 MMOL/L — SIGNIFICANT CHANGE UP (ref 3.5–5.3)
PROT SERPL-MCNC: 6.6 G/DL — SIGNIFICANT CHANGE UP (ref 6–8.3)
PROT UR-MCNC: SIGNIFICANT CHANGE UP
RBC # BLD: 4.24 M/UL — SIGNIFICANT CHANGE UP (ref 3.8–5.2)
RBC # FLD: 16.1 % — HIGH (ref 10.3–14.5)
RBC CASTS # UR COMP ASSIST: HIGH (ref 0–?)
SODIUM SERPL-SCNC: 138 MMOL/L — SIGNIFICANT CHANGE UP (ref 135–145)
SP GR SPEC: 1.02 — SIGNIFICANT CHANGE UP (ref 1–1.04)
SQUAMOUS # UR AUTO: SIGNIFICANT CHANGE UP
UROBILINOGEN FLD QL: HIGH
WBC # BLD: 8.36 K/UL — SIGNIFICANT CHANGE UP (ref 3.8–10.5)
WBC # FLD AUTO: 8.36 K/UL — SIGNIFICANT CHANGE UP (ref 3.8–10.5)
WBC UR QL: >50 — HIGH (ref 0–?)

## 2018-08-18 PROCEDURE — 90792 PSYCH DIAG EVAL W/MED SRVCS: CPT | Mod: GC

## 2018-08-18 PROCEDURE — 99284 EMERGENCY DEPT VISIT MOD MDM: CPT

## 2018-08-18 RX ORDER — NITROFURANTOIN MACROCRYSTAL 50 MG
1 CAPSULE ORAL
Qty: 10 | Refills: 0 | OUTPATIENT
Start: 2018-08-18 | End: 2018-08-22

## 2018-08-18 RX ORDER — KETOROLAC TROMETHAMINE 30 MG/ML
30 SYRINGE (ML) INJECTION ONCE
Qty: 0 | Refills: 0 | Status: DISCONTINUED | OUTPATIENT
Start: 2018-08-18 | End: 2018-08-18

## 2018-08-18 RX ORDER — ACETAMINOPHEN 500 MG
1000 TABLET ORAL ONCE
Qty: 0 | Refills: 0 | Status: DISCONTINUED | OUTPATIENT
Start: 2018-08-18 | End: 2018-08-18

## 2018-08-18 RX ORDER — NITROFURANTOIN MACROCRYSTAL 50 MG
100 CAPSULE ORAL ONCE
Qty: 0 | Refills: 0 | Status: COMPLETED | OUTPATIENT
Start: 2018-08-18 | End: 2018-08-18

## 2018-08-18 RX ORDER — MORPHINE SULFATE 50 MG/1
4 CAPSULE, EXTENDED RELEASE ORAL ONCE
Qty: 0 | Refills: 0 | Status: DISCONTINUED | OUTPATIENT
Start: 2018-08-18 | End: 2018-08-18

## 2018-08-18 RX ADMIN — Medication 100 MILLIGRAM(S): at 20:11

## 2018-08-18 RX ADMIN — Medication 30 MILLIGRAM(S): at 18:10

## 2018-08-18 NOTE — ED BEHAVIORAL HEALTH ASSESSMENT NOTE - REFERRAL / APPOINTMENT DETAILS
SW made  referral- no appointments available (see  note) patient being referred to [facility] walk in- crisis clinic Pt will f/u with her PCP who prescribes her psychiatric medications and can also follow up at crisis clinic at [facility] if she develops psychiatric symptoms or wants to start psych treatment.

## 2018-08-18 NOTE — ED BEHAVIORAL HEALTH ASSESSMENT NOTE - SUICIDE PROTECTIVE FACTORS
Responsibility to family and others/Identifies reasons for living/Future oriented/Fear of death or dying due to pain/suffering/Positive therapeutic relationships

## 2018-08-18 NOTE — ED BEHAVIORAL HEALTH ASSESSMENT NOTE - CURRENT MEDICATION
Topamax 400mg QHS, Klonopin 3mg QHS, Spiriva, Singulair 10mg QHS, Paxil 30mg QD Topamax 400mg QHS, Klonopin 3mg QHS, Spiriva, Singulair 10mg QHS, Paxil 30mg QD (ISTOP)

## 2018-08-18 NOTE — ED BEHAVIORAL HEALTH ASSESSMENT NOTE - PAST PSYCHOTROPIC MEDICATION
PREOPERATIVE INSTRUCTIONS REVIEWED WITH PATIENT& HIS DAUGHTER TATI EVANS. WRITTEN INSTRUCTIONS HANDED. THEY BOTH VERBALIZED UNDERSTANDING. PATIENT DAUGHTER WERE GIVEN THE  OPPORTUNITY TO ASK QUESTIONS ON THE INFORMATION PROVIDED. daily none Haldol

## 2018-08-18 NOTE — ED BEHAVIORAL HEALTH ASSESSMENT NOTE - DESCRIPTION
During course of hospitalization patient was calm and cooperative. Patient was not aggressive or violent and did not require PRN medications.    Vital Signs Last 24 Hrs  T(C): 37.4 (24 May 2018 07:19), Max: 37.4 (24 May 2018 07:19)  T(F): 99.4 (24 May 2018 07:19), Max: 99.4 (24 May 2018 07:19)  HR: 94 (24 May 2018 07:19) (94 - 94)  BP: 122/84 (24 May 2018 07:19) (122/84 - 122/84)  BP(mean): --  RR: 16 (24 May 2018 07:19) (16 - 16)  SpO2: 95% (24 May 2018 07:19) (95% - 95%) Breast Cancer BRCA-1 in RM, Epilepsy, COPD, Pre-diabetic, migraines, bilateral calcification of kidneys and asthma. see hpi

## 2018-08-18 NOTE — ED ADULT NURSE REASSESSMENT NOTE - NS ED NURSE REASSESS COMMENT FT1
Break coverage note:  Pt becoming agitated, refusing Toradol, demanding Morphine.  Pt attempting to get dressed to leave.  Pt refusing to remove pants, clothing and other personal belongings secured.  Resident aware of pt's current behavior and in to evaluate pt.   Placed on enhanced supervision at this time.

## 2018-08-18 NOTE — ED BEHAVIORAL HEALTH ASSESSMENT NOTE - DETAILS
self referred endorses back/breast pain- informed Dr. Pope see hpi; patient does not remember method of suicide attempt in 2013 MD in ED made aware abdominal pain

## 2018-08-18 NOTE — ED BEHAVIORAL HEALTH ASSESSMENT NOTE - CASE SUMMARY
Patient is a 53 year old single unemployed non-caregiver  female currently residing in a private residence. PPH per patient MDD & Anxiety.  Pt has capacity to refuse medical treatment, no SI/HI or psychosis, does not meet criteria for psych admission.

## 2018-08-18 NOTE — ED PROVIDER NOTE - OBJECTIVE STATEMENT
53 F last seen in  yesterday and dispo'd home by evaluating psychiatrist. p/w abdominal pain radiating to the back x 2 days assoc with N/V. History limited by patient's distractability. No dysuria, hematuria. Disimpacts self to pass bowel movements. No fevers/chills. Requesting morphine for pain. No CP/SOB.

## 2018-08-18 NOTE — ED ADULT NURSE REASSESSMENT NOTE - NS ED NURSE REASSESS COMMENT FT1
Break coverage note:  Pt s/p multiple IV sticks, no IV access at this time.  D/W resident, as per resident no indication for IV at this time.  Pt c/o abdominal pain, requesting pain medication IM.  Awaiting new MAR orders.

## 2018-08-18 NOTE — ED PROVIDER NOTE - PROGRESS NOTE DETAILS
MD Son While in process of evaluating patient with labs/CT abd, she demands to leave AMA.  I have performed a bedside evaluation, and given her flighty ideas, lack of insight into our concerns (UTI/Pyelo/renal stone) or the consequences of non-treatment, I do not believe she has capacity to AMA.  As such we have consulted psychiatry and placed her on a 1;1. Medical clearance is not complete and she needs CT abd/pelvis to r/o stones, and minimum antibiotics for UTI/pyelo.  The patient has made it clear to us that she will NOT take antibiotics, even if we prescribe them. MD Son While in process of evaluating patient with labs/CT abd, she demanded to leave AMA.  I have performed a bedside evaluation, and given her flighty train of thought, lack of insight into our concerns (UTI/Pyelo/renal stone) or the consequences of non-treatment (sepsis/death), I do not believe she has capacity to AMA.  As such we have consulted psychiatry and placed her on a 1;1. Medical clearance is not complete and she needs CT abd/pelvis to r/o stones, and minimum antibiotics for UTI/pyelo.  The patient has made it clear to us that she will NOT take antibiotics, even if we prescribe them.  Patient's treatment plan signed-out to Dr. Roberts during a face-to-face encounter with the patient.

## 2018-08-18 NOTE — ED BEHAVIORAL HEALTH ASSESSMENT NOTE - SUMMARY
Patient is a 53 year old single unemployed non-caregiver  female currently residing in a private residence. PPHx per patient MDD & Anxiety. She endorses history of 1 past inpatient admission many years ago. She has a history of 1 past suicide attempt in 2013. She denies history of violence, aggression, legal issues. Endorses history of social alcohol /MJ use and cocaine use 30+ years ago. No recent substance abuse. No history of rehab/detox/withdrawal symptoms/DTs or withdrawal seizures. PMH includes- Breast Cancer BRCA-1 in RM, Epilepsy, COPD, Pre-diabetic, migraines, bilateral calcification of kidneys and asthma. BIBA for hallucination.     Patient presents to the ER in the context of possible hallucination. She reports waking up and seeing her /someone else on her couch so she called 911. She is able to reality test understanding it is unlikely her  or anyone else was in her home and that this incident may be related to her grogginess upon awakening and hyponopompic hallucinations.     She denied SI/HI/SIB/intent/plan, depression, manic symptoms, other psychotic symptoms or any other mental health issues. She was future oriented and able to safety plan. She does not present acutely psychotic or disorganized. She is not seeking and refused inpatient psychiatric admission and is requesting discharge home. Patient does not present an imminent risk to self or others and does not meet criteria for involuntary inpatient admission. Recommend discharge home. LISSETTE made  referral- no appointments available (see  note) patient being referred to [facility] walk in- crisis clinic. Extensive safety planning performed. Patient agreeing verbally to return patient to ER or call 911 if symptoms worsen or patient has urges to harm self or others. Patient is a 53 year old single unemployed non-caregiver  female currently residing in a private residence. PPH per patient MDD & Anxiety. She endorses history of 1 past inpatient admission many years ago. She has a history of 1 past suicide attempts in  and  via OD after her father . She denies history of violence, aggression, legal issues. Endorses history of social alcohol /MJ use and cocaine use 30+ years ago. No recent substance abuse. No history of rehab/detox/withdrawal symptoms/DTs or withdrawal seizures. PMH includes- Breast Cancer BRCA-1 in RM, Epilepsy, COPD, kidney stones, Pre-diabetic, migraines, bilateral calcification of kidneys and asthma. brought in by ambulance for pain after someone called 911 after patient was found on the grass in her neighborhood.     Consult called for consult for capacity to leave AMA and she is currently on a 1:1. Patient is able to communicate a choice, understand relevant information, appreciates the situation and its consequences and can reason about treatment options. She therefore has capacity to leave AMA. She notes that she would rather wait to get her CT scan on Friday, but she agrees to wait currently in the ED and get a CT scan. She says she wanted to leave earlier because she has been in the hospital for a long time. She understands that the doctors want to do a CT scan to rule out kidney stones. She understands the risk of CT scan includes radiation and the benefit would be for diagnostic and treatment purposes. She also understands the doctors want to treat her UTI. She undesrtands that if she does not get treated or goes home before the scan she may be in pain later or her condition may worsen. She knows she needs the scan and can also get it on Friday at her outpatient doctor. She is about to engage in a rational process of manipulating information and has decided to stay and get the scan. She currently has capacity to leave AMA at this time, but this can be reassessed if change in clinical status. In addition, patient does not have any acute psychiatric symptoms, and denies S/H/I/I/P so she does not require inpatient hospitalization.

## 2018-08-18 NOTE — ED PROVIDER NOTE - ATTENDING CONTRIBUTION TO CARE
MD Sno:  patient seen and evaluated with the resident.  I was present for key portions of the History & Physical, and I agree with the Impression & Plan.  MD Son:  52 yo F, c/o abdominal/back pain.  Duration: 2d with associated n/v.  Patient has Hx of anxiety depression, and is a very poor historian.  She is flighty of ideas, refusing toradol, and only wants us to give morphine for the pain.  She denies CP/SOB.  VS - wnl.  +suprapubic and B flank pain.  RRR, CTA B.  impression:  UTI vs pyelo, possible renal stone.  Possible BPAD.

## 2018-08-18 NOTE — ED ADULT NURSE REASSESSMENT NOTE - NS ED NURSE REASSESS COMMENT FT1
pt placed on enhance supervision, at risk for elopement, pt with flight if ideas, yelling at staff, refusing medication and ct scan, psych called to consult to determine capacity. pt difficult to redirect, verbally aggressive and manipulative. pending psych consult. Will continue to monitor. Belongings secured at [place] [address] nursing station. pt placed on enhanced supervision, at risk for elopement, pt with flight of ideas, yelling at staff, refusing vital signs, medication and ct scan, psych called to consult to determine capacity. pt difficult to redirect, verbally aggressive and manipulative. pending psych consult. Will continue to monitor. Belongings secured at [place] [id number] nursing [facility].

## 2018-08-18 NOTE — ED ADULT NURSE NOTE - ED STAT RN HANDOFF DETAILS
pt deemed by psych to have capacity, pt given medication, states she wants to take it at home, pts belongings returned,  called to set up transport, pt walked out to waiting room, told to wait there for transportation.

## 2018-08-18 NOTE — ED ADULT TRIAGE NOTE - CHIEF COMPLAINT QUOTE
pt seen and DC'd from  last night for chest pain and anxiety. pt presents to ED yelling and screaming "don't sent me to psych, its medical". pt reporting bilateral flank pain radiating to the middle of the abd. pt irate in triage.

## 2018-08-18 NOTE — ED ADULT NURSE NOTE - INTERVENTIONS DEFINITIONS
Stretcher in lowest position, wheels locked, appropriate side rails in place/Instruct patient to call for assistance/Physically safe environment: no spills, clutter or unnecessary equipment

## 2018-08-18 NOTE — ED BEHAVIORAL HEALTH ASSESSMENT NOTE - HPI (INCLUDE ILLNESS QUALITY, SEVERITY, DURATION, TIMING, CONTEXT, MODIFYING FACTORS, ASSOCIATED SIGNS AND SYMPTOMS)
Patient is a 53 year old single unemployed non-caregiver  female currently residing in a private residence. PPH per patient MDD & Anxiety. She endorses history of 1 past inpatient admission many years ago. She has a history of 1 past suicide attempts in  and  via OD after her father . She denies history of violence, aggression, legal issues. Endorses history of social alcohol /MJ use and cocaine use 30+ years ago. No recent substance abuse. No history of rehab/detox/withdrawal symptoms/DTs or withdrawal seizures. PMH includes- Breast Cancer BRCA-1 in RM, Epilepsy, COPD, kidney stones, Pre-diabetic, migraines, bilateral calcification of kidneys and asthma. brought in by ambulance for pain after someone called 911 after patient was found on the grass in her neighborhood.     The patient presented to the ED yesterday "feeling like I am going to have a seizure" and was discharged home. Today she says she was in a lot of pain and came to the ED because she thinks she has a kidney stone. She notes that she has abdominal pain radiating to the back x 2 days assoc with N/V. The primary team wants to do a CT and the patient wants to leave AMA. ED team assessed patient in ED and did not think she   has capacity to leave AMA. Psychiatry asked to consult to see if patient has capacity to leave AMA.     On interview the patient is able to state that she came to the ED because of pain that is really bad. She understands that the primary team told her she has a UTI and needs antibiotics and that she also requires a CT scan to rule out kidney stone and other medical issues. She states the risk of having a CT scan as radiation, especially because she has a history of cancer, and understands the necessity for the CT scan to help make a diagnosis. She knows the risk of not having a CT scan is that she may be in pain when she goes home. She states that she is willing to get the CT scan in the hospital, but is just frustrated because she had to wait a long time. However, she has an appointment with her outpatient doctor Dr. Han who she states can also do a CT scan on Friday, but is currently willing to cooperate with medical staff and do the CT scan in the ED. Patient understands also that she needs antibiotics for treatment of UTI, but states there are a lot of medications that she is allergic to.     Psychiatrically, the patient is A+O x 3. (knows name, situation, year, day of week, and hospital name, though states month as July). Although she has rapid, distractible speech, she has no evidence of psychosis, denies paranoia, denies auditory/visual hallucinations. She endorses some depression and poor sleep, with normal appetite. Denies S/H/I/I/P. No access to weapons. Patient is a 53 year old single unemployed non-caregiver  female currently residing in a private residence. PPH per patient MDD & Anxiety. She endorses history of 1 past inpatient admission many years ago. She has a history of 1 past suicide attempts in  and  via OD after her father . She denies history of violence, aggression, legal issues. Endorses history of social alcohol /MJ use and cocaine use 30+ years ago. No recent substance abuse. No history of rehab/detox/withdrawal symptoms/DTs or withdrawal seizures. PMH includes- Breast Cancer BRCA-1 in RM, Epilepsy, COPD, kidney stones, Pre-diabetic, migraines, bilateral calcification of kidneys and asthma. brought in by ambulance for pain after someone called 911 after patient was found on the grass in her neighborhood.     The patient presented to the ED yesterday "feeling like I am going to have a seizure" and was discharged home. Today she says she was in a lot of pain and came to the ED because she thinks she has a kidney stone. She states she was on the grass in her neighborhood in a lot of pain and someone called 911. She notes that she has abdominal pain radiating to the back x 2 days assoc with N/V. The primary team wants to do a CT and the patient wants to leave AMA. ED team assessed patient in ED and did not think she has capacity to leave AMA at the time of their assessment. Psychiatry asked to consult to see if patient has capacity to leave AMA.     On interview the patient is able to state that she came to the ED because of pain that is really bad. She understands that the primary team told her she has a UTI and needs antibiotics and that she also requires a CT scan to rule out kidney stone and other medical issues. She states the risk of having a CT scan as radiation, especially because she has a history of cancer, and understands the necessity for the CT scan to help make a diagnosis. She knows if she leaves AMA she will not get a CT scan. She knows the risk of not having a CT scan is that she may be in pain when she goes home. She states that she is willing to get the CT scan in the hospital, but is just frustrated because she had to wait a long time and that is why she initially wanted to leave. However, she has an appointment with her outpatient doctor Dr. Han who she states can also do a CT scan on Friday, but is currently willing to cooperate with medical staff and do the CT scan in the ED. Patient understands also that she needs antibiotics for treatment of UTI, but states there are a lot of medications that she is allergic to, which makes it difficult to treat her UTI. She understands her condition may worsen if she leaves AMA.     Psychiatrically, the patient is A+O x 3. (knows name, situation, year, day of week, and hospital name, though states month as July). Although she has rapid, distractible speech, and at times is tangential, she has no evidence of reena, psychosis, denies paranoia, denies auditory/visual hallucinations. She endorses some depression and poor sleep, denies anhedonia and poor concentration, with normal appetite. Denies S/H/I/I/P. No access to weapons. Denies current substance use. Smokes cigarettes.

## 2018-08-18 NOTE — ED PROVIDER NOTE - PHYSICAL EXAMINATION
PHYSICAL EXAM:    GENERAL: NAD, thin, malodorous.   HEENT:  Atraumatic, Normocephalic,  conjunctiva and sclera clear  CHEST/LUNG: Clear to auscultation bilaterally; No rhonchis, rales, or wheezing  HEART: Regular rate and rhythm; S1, S2; No murmurs, rubs, or gallops  ABDOMEN: Soft, abdomen generally tender without focus, no rebound, nondistended; Normoactive bowel sounds  EXTREMITIES:  2+ Peripheral Pulses, No clubbing, cyanosis, or edema.  MSK: R sided knee guard for "former ACL tear".   SKIN: No rashes or lesions  PSYCH: AAOx3, very distracted. PHYSICAL EXAM:    GENERAL: NAD, thin, malodorous.   HEENT:  Atraumatic, Normocephalic,  conjunctiva and sclera clear  CHEST/LUNG: Clear to auscultation bilaterally; No rhonchis, rales, or wheezing  HEART: Regular rate and rhythm; S1, S2; No murmurs, rubs, or gallops  ABDOMEN: Soft, abdomen generally tender without focus, no rebound, nondistended; Normoactive bowel sounds  EXTREMITIES:  2+ Peripheral Pulses, No clubbing, cyanosis, or edema.  MSK: R sided knee guard for "former ACL tear".   SKIN: No rashes or lesions  PSYCH: AAOx2, flighty, tangential thought.

## 2018-08-18 NOTE — PROVIDER CONTACT NOTE (OTHER) - ASSESSMENT
Medical team  referred this case as pt requesting a cab to go home through their insurance.  Writer contacted [facility] – [phone] spoke with MsKatherine Cortese and trip invoice number # [id number] and Ms. Dash  contacted [facility] [phone] . Writer spoke with Mr. Harris at [facility] informed [phone]  that pt will be picked up in 9:30 Pm at adult ED ramp and medical team and pt is aware. writer provided the pt with  [facility] [phone] information.  No further SW intervention needed at this time for this visit.

## 2018-08-18 NOTE — ED ADULT NURSE NOTE - OBJECTIVE STATEMENT
Pt presents to rm 13, A&Ox3, ambulatory at baseline w/o assistance, pmhx of kidney stones, asthma, GERD, epilepsy, depression, anxiety, COPD, and left sided breast cancer, pshx of hysterectomy and left sided lumpectomy, pt is poor historian, here for evaluation of left sided abd. pain, nausea, and intermittent lower back pain, pt states feeling bloated and constipated- states she took stool softener this morning and disimpacted herself, abdomen soft to touch, and nontender to palpation. Denies chest pain, shortness of breath, palpitations, diaphoresis, headaches, fevers, dizziness, vomiting, diarrhea, or urinary symptoms at this time. Call bell in reach, warm blanket provided, bed in lowest position, side rails up x2, MD evaluation in progress. Will continue to monitor.

## 2018-08-19 ENCOUNTER — EMERGENCY (EMERGENCY)
Facility: HOSPITAL | Age: 53
LOS: 1 days | Discharge: LEFT BEFORE DISCHARGE | End: 2018-08-19
Attending: EMERGENCY MEDICINE | Admitting: EMERGENCY MEDICINE
Payer: MEDICAID

## 2018-08-19 VITALS
RESPIRATION RATE: 18 BRPM | HEART RATE: 59 BPM | DIASTOLIC BLOOD PRESSURE: 52 MMHG | OXYGEN SATURATION: 100 % | TEMPERATURE: 98 F | SYSTOLIC BLOOD PRESSURE: 97 MMHG

## 2018-08-19 DIAGNOSIS — Z98.89 OTHER SPECIFIED POSTPROCEDURAL STATES: Chronic | ICD-10-CM

## 2018-08-19 PROCEDURE — 99284 EMERGENCY DEPT VISIT MOD MDM: CPT | Mod: 25

## 2018-08-20 DIAGNOSIS — Z71.89 OTHER SPECIFIED COUNSELING: ICD-10-CM

## 2018-08-20 LAB
ALBUMIN SERPL ELPH-MCNC: 3.8 G/DL — SIGNIFICANT CHANGE UP (ref 3.3–5)
ALP SERPL-CCNC: 65 U/L — SIGNIFICANT CHANGE UP (ref 40–120)
ALT FLD-CCNC: 18 U/L — SIGNIFICANT CHANGE UP (ref 4–33)
AST SERPL-CCNC: 17 U/L — SIGNIFICANT CHANGE UP (ref 4–32)
BACTERIA UR CULT: SIGNIFICANT CHANGE UP
BASOPHILS # BLD AUTO: 0.02 K/UL — SIGNIFICANT CHANGE UP (ref 0–0.2)
BASOPHILS NFR BLD AUTO: 0.1 % — SIGNIFICANT CHANGE UP (ref 0–2)
BILIRUB SERPL-MCNC: 0.3 MG/DL — SIGNIFICANT CHANGE UP (ref 0.2–1.2)
BUN SERPL-MCNC: 22 MG/DL — SIGNIFICANT CHANGE UP (ref 7–23)
CALCIUM SERPL-MCNC: 8.4 MG/DL — SIGNIFICANT CHANGE UP (ref 8.4–10.5)
CHLORIDE SERPL-SCNC: 104 MMOL/L — SIGNIFICANT CHANGE UP (ref 98–107)
CO2 SERPL-SCNC: 16 MMOL/L — LOW (ref 22–31)
CREAT SERPL-MCNC: 1.05 MG/DL — SIGNIFICANT CHANGE UP (ref 0.5–1.3)
EOSINOPHIL # BLD AUTO: 0.43 K/UL — SIGNIFICANT CHANGE UP (ref 0–0.5)
EOSINOPHIL NFR BLD AUTO: 2.9 % — SIGNIFICANT CHANGE UP (ref 0–6)
GLUCOSE SERPL-MCNC: 97 MG/DL — SIGNIFICANT CHANGE UP (ref 70–99)
HCT VFR BLD CALC: 37.4 % — SIGNIFICANT CHANGE UP (ref 34.5–45)
HGB BLD-MCNC: 12.3 G/DL — SIGNIFICANT CHANGE UP (ref 11.5–15.5)
IMM GRANULOCYTES # BLD AUTO: 0.05 # — SIGNIFICANT CHANGE UP
IMM GRANULOCYTES NFR BLD AUTO: 0.3 % — SIGNIFICANT CHANGE UP (ref 0–1.5)
LIDOCAIN IGE QN: 14 U/L — SIGNIFICANT CHANGE UP (ref 7–60)
LYMPHOCYTES # BLD AUTO: 1.11 K/UL — SIGNIFICANT CHANGE UP (ref 1–3.3)
LYMPHOCYTES # BLD AUTO: 7.5 % — LOW (ref 13–44)
MCHC RBC-ENTMCNC: 30.4 PG — SIGNIFICANT CHANGE UP (ref 27–34)
MCHC RBC-ENTMCNC: 32.9 % — SIGNIFICANT CHANGE UP (ref 32–36)
MCV RBC AUTO: 92.6 FL — SIGNIFICANT CHANGE UP (ref 80–100)
MONOCYTES # BLD AUTO: 0.72 K/UL — SIGNIFICANT CHANGE UP (ref 0–0.9)
MONOCYTES NFR BLD AUTO: 4.8 % — SIGNIFICANT CHANGE UP (ref 2–14)
NEUTROPHILS # BLD AUTO: 12.54 K/UL — HIGH (ref 1.8–7.4)
NEUTROPHILS NFR BLD AUTO: 84.4 % — HIGH (ref 43–77)
NRBC # FLD: 0 — SIGNIFICANT CHANGE UP
PLATELET # BLD AUTO: 260 K/UL — SIGNIFICANT CHANGE UP (ref 150–400)
PMV BLD: 10.9 FL — SIGNIFICANT CHANGE UP (ref 7–13)
POTASSIUM SERPL-MCNC: 2.8 MMOL/L — CRITICAL LOW (ref 3.5–5.3)
POTASSIUM SERPL-SCNC: 2.8 MMOL/L — CRITICAL LOW (ref 3.5–5.3)
PROT SERPL-MCNC: 6.4 G/DL — SIGNIFICANT CHANGE UP (ref 6–8.3)
RBC # BLD: 4.04 M/UL — SIGNIFICANT CHANGE UP (ref 3.8–5.2)
RBC # FLD: 15.8 % — HIGH (ref 10.3–14.5)
SODIUM SERPL-SCNC: 139 MMOL/L — SIGNIFICANT CHANGE UP (ref 135–145)
SPECIMEN SOURCE: SIGNIFICANT CHANGE UP
WBC # BLD: 14.87 K/UL — HIGH (ref 3.8–10.5)
WBC # FLD AUTO: 14.87 K/UL — HIGH (ref 3.8–10.5)

## 2018-08-20 PROCEDURE — 74176 CT ABD & PELVIS W/O CONTRAST: CPT | Mod: 26

## 2018-08-20 RX ORDER — KETOROLAC TROMETHAMINE 30 MG/ML
15 SYRINGE (ML) INJECTION ONCE
Qty: 0 | Refills: 0 | Status: DISCONTINUED | OUTPATIENT
Start: 2018-08-20 | End: 2018-08-20

## 2018-08-20 RX ORDER — ONDANSETRON 8 MG/1
4 TABLET, FILM COATED ORAL ONCE
Qty: 0 | Refills: 0 | Status: DISCONTINUED | OUTPATIENT
Start: 2018-08-20 | End: 2018-08-23

## 2018-08-20 RX ORDER — POTASSIUM CHLORIDE 20 MEQ
10 PACKET (EA) ORAL ONCE
Qty: 0 | Refills: 0 | Status: COMPLETED | OUTPATIENT
Start: 2018-08-20 | End: 2018-08-20

## 2018-08-20 RX ORDER — NITROFURANTOIN MACROCRYSTAL 50 MG
100 CAPSULE ORAL ONCE
Qty: 0 | Refills: 0 | Status: COMPLETED | OUTPATIENT
Start: 2018-08-20 | End: 2018-08-20

## 2018-08-20 RX ORDER — MORPHINE SULFATE 50 MG/1
4 CAPSULE, EXTENDED RELEASE ORAL ONCE
Qty: 0 | Refills: 0 | Status: DISCONTINUED | OUTPATIENT
Start: 2018-08-20 | End: 2018-08-20

## 2018-08-20 RX ORDER — MORPHINE SULFATE 50 MG/1
2 CAPSULE, EXTENDED RELEASE ORAL ONCE
Qty: 0 | Refills: 0 | Status: COMPLETED | OUTPATIENT
Start: 2018-08-20 | End: 2018-08-20

## 2018-08-20 RX ORDER — POTASSIUM CHLORIDE 20 MEQ
40 PACKET (EA) ORAL ONCE
Qty: 0 | Refills: 0 | Status: COMPLETED | OUTPATIENT
Start: 2018-08-20 | End: 2018-08-20

## 2018-08-20 RX ADMIN — Medication 100 MILLIEQUIVALENT(S): at 04:18

## 2018-08-20 RX ADMIN — Medication 40 MILLIEQUIVALENT(S): at 04:18

## 2018-08-20 RX ADMIN — Medication 100 MILLIGRAM(S): at 04:18

## 2018-08-20 NOTE — ED PROVIDER NOTE - PHYSICAL EXAMINATION
General: well appearing female, no acute distress   HEENT: dry mucous membranes   Respiratory: normal work of breathing, lungs clear to auscultation bilaterally   Cardiac: regular rate and rhythm   Abdomen: soft, diffuse mild tenderness to palpation, no guarding or rebound   MSK: no swelling or tenderness of lower extremities   Skin: no rashes   Neuro: A&Ox3

## 2018-08-20 NOTE — ED PROVIDER NOTE - OBJECTIVE STATEMENT
53F, PMH Bipolar, Asthma, Epilepsy, COPD, GERD, Breast Cancer presenting with abdominal pain. Patient reports coming to the ED for pain medicine, specifically morphine. Was seen in ED yesterday for abdominal pain and diagnosed with a UTI but did not  her antibiotic today. Reports pain returned when she went home so coming in for treatment. Complaining of chronic back and leg pain. No headache, chest pain, difficulty breathing, nausea, vomiting, pain or burning with urination.

## 2018-08-20 NOTE — ED PROVIDER NOTE - PLAN OF CARE
Please follow-up with your primary care doctor in the next 24-48 hours   If you have any worsening abdominal pain or are unable to tolerate fluids please return to the emergency department

## 2018-08-20 NOTE — ED PROVIDER NOTE - PROGRESS NOTE DETAILS
freeman: pt demanding to have morphine.  lab shows k 2.8- denies any cp/palpitations.  admits to having chronic low K.  refusing to have potassium through iv 40meq po given.  pt refusing repeat blood test to assess K and wants to sign ama.  has full capacity. freeman: left ambulatory.  ama due to refusing wait for lab work after replacement of K.

## 2018-08-20 NOTE — ED ADULT NURSE NOTE - CHIEF COMPLAINT QUOTE
C/o abdominal pain generalized intermittently x several days, has been seen here multiple times this week for similar complaints. Noted to be slow to answer questions, denies drug or alcohol use  states " I need morphine" PMH depression/anxiety

## 2018-08-20 NOTE — ED PROVIDER NOTE - ATTENDING CONTRIBUTION TO CARE
I was physically present for the E/M service provided. I agree with above history, physical, and plan which I have reviewed and edited where appropriate. I was physically present for the key portions of the service provided.    53F, PMH Bipolar, Asthma, Epilepsy, COPD, GERD, Breast Cancer presenting with abdominal pain. pt requesting morphine and states, "if you give it to me I will leave bc it stays in my system for 3 days". pt points to lower abd pain.  prior had ua showing uti and everytime in ed refuses ct after receiving pain meds.    *GEN:   comfortable, in no apparent distress, AOx3, slender  *EYES:   PERRL, extra-occular movements intact  *HEENT:   airway patent, moist mucosal membranes, uvula midline  *CV:   regular rate and rhythm, normal S1/S2, no murmur  *RESP:   clear to auscultation bilaterally, non-labored, speaking in full sentences  *ABD:   soft, non tender, no guarding  *:   no cva tenderness  *MSK:   no musculoskeletal tenderness, 5/5 strength, moving all extremity  *SKIN:   dry, intact, no rash  *NEURO:   AOx3, no focal weakness or loss of sensation, GCS 14    a/p: opiod dependency r/o intraabd path vs persistent uti.  labs, ct, re-assess, detox and .

## 2018-08-20 NOTE — ED ADULT NURSE NOTE - OBJECTIVE STATEMENT
Break coverage RN received pt to spot 24 A&ox4 however noted to be slow to answer questions, reports abdominal pain worsening x several days, has been seen here multiple times in the last week for same complaint with negative workup. Pt states "I was in so much pain I almost cut my neighbor with a razor we do eye for eye and tooth for tooth". Denies drug or alcohol abuse. Will continue to monitor.

## 2018-08-20 NOTE — ED PROVIDER NOTE - MEDICAL DECISION MAKING DETAILS
53F presenting with abdominal pain. patient specifically stating she came to ED for morphine for pain control. appears comfortable. patient agrees with plan for labs, CT abdomen (which she refused previously) before receiving morphine. patient refuses any other pain medication at this time. will reassess.

## 2018-08-20 NOTE — ED PROVIDER NOTE - REFUSAL OF SERVICE, MDM
I had a detailed discussion with the patient and/or guardian regarding the historical points, exam findings, and any diagnostic results supporting the need for repeat potassium after replacement. Pt refuses and is aware of risks  such as worsening condition and possible death. Patient is fully alert and oriented and has self determination and mental capacity to make decisions.

## 2019-05-08 NOTE — ED ADULT NURSE NOTE - NS_BH TRG QUESTION8_ED_ALL_ED
It is NOT necessary to avoid eating \"greens\" while taking warfarin.  You just need to be consistent in approximately how much you eat each week.      There are only 5 FOODS TO AVOID with warfarin:      Do NOT eat: Cooked spinach  (raw is okay though).      Cranberries in any form (juice, muffins, craisens).      Grapefruit.      Mangoes      Pomegranate      Please call the [facility] if any of your medications change.  This means: if a med is discontinued, a new med is started, or a dose is changed.  These meds may interact with your warfarin and we may need to adjust your dose.  This is especially important if you start any type of ANTIBIOTIC.   
No

## 2019-06-27 NOTE — ED BEHAVIORAL HEALTH ASSESSMENT NOTE - NS ED BHA PLAN TR BH CONTACTED FT
Vaccine Information Statement(s) for was given today. This has been reviewed, questions answered, and verbal consent given by Patient for injection(s) and administration of Pneumococcal Conjugate (PCV13).      Patient tolerated without incident. See immunization grid for documentation.     PCP Dr. Robin Bloom- contacted no psychiatrist

## 2020-06-24 NOTE — ED PROVIDER NOTE - TOBACCO USE
This was an emergent procedure and consent was implied. This was an emergent procedure and consent was implied. Current some day smoker

## 2020-07-08 NOTE — ED ADULT NURSE NOTE - FALLEN IN THE PAST
Activity  • For the rest of the day, do NOT:  • Work  • Stay alone  • Drive a car   • Operate electrical/power tools or appliances  • Drink alcohol  • Sign any legal papers  • Apply heat to the injection site    · You may:  · Apply ice to the injection site for up to 15 minutes at a time for comfort as long as ice is sealed in a water-tight bag so that dressings do not become wet.  · Resume activity as tolerated today  · Resume your pre-procedure activity or restrictions tomorrow.    Dressing Care  • Keep injection site clean and dry.   • You may use an ice pack on and off over the injection site for the next 24 hours while awake.    Bathing  • You may shower tomorrow and bathe in two days.    Diet  · Resume your normal pre-procedure diet today.    Medication  • Continue home medications, unless otherwise instructed.    Follow Up  · We will call you tomorrow to review your pain diary entries, evaluate the effectiveness of the procedure, and plan for next steps.      Call Dr. Mckinney office at [phone] if you have the following:  · Drainage, increase in redness and/or swelling from the injection site. Some spotting of the dressings over the injection site is normal, but drainage that pools, soaks, or drips from the dressing is not normal.  · Temperature above 101 degrees, chills, sweats, or body aches.  · Numbness or weakness of your legs or arms, different than before the injection.  · Severe headache.     no

## 2021-03-08 NOTE — ED PROVIDER NOTE - PSH
History of tonsillectomy    S/P breast lumpectomy  L breast with lymph node removal  S/P hysterectomy Name band;

## 2021-09-02 NOTE — ED ADULT NURSE NOTE - NS PRO PASSIVE SMOKE EXP
----- Message from NATALY Landon CNP sent at 9/2/2021  7:45 AM EDT -----  CMP within normal limits pending other labs Unknown

## 2022-11-30 NOTE — ED BEHAVIORAL HEALTH ASSESSMENT NOTE - NS ED BHA MSE SPEECH RATE
Patient is healing well. Vision should continue to improve. Continue following the drop calendar. Normal

## 2023-02-15 NOTE — ED PROVIDER NOTE - PHYSICAL EXAMINATION
The patient is seen back today to review results of her ultrasound and review a new issue.  This visit was initially set up a telehealth, but she came into the office and we saw her live. Ultrasound performed on January 16 showed evidence of a fatty liver.  There was also a 3 mm gallbladder polyp.  No evidence of gallstones.  I did not have any liver tests on her chart.  The patient stated she had these with her primary care office.  We called over and had all labs sent over.  These were all reviewed today but contained no liver panel.  It was only electrolytes.  She also wanted further explanation only a hiatal hernia is.  She remains on pantoprazole which is excellent to controlling her heartburn.  As an additional factor she wanted to know about a mass in the left side of her abdomen.  This has been present for many years near her left rib cage.
right lower molar: erupted wisdom tooth; no abscess

## 2023-08-15 NOTE — ED PROVIDER NOTE - PSH
Kymberly Quick  YOB: 1979   TAF:[id number]    Subjective   40 y.o. female who has a past medical history of Abdominal pain, right upper quadrant, Acute cystitis, Allergic rhinitis, Anxiety, Arthritis, CAD (coronary artery disease), Depression (emotion), DUB (dysfunctional uterine bleeding), Generalized anxiety disorder, GERD (gastroesophageal reflux disease), Hypertriglyceridemia, Hypoglycemia, Hypothyroid, Movement disorder, Pancreatitis, PONV (postoperative nausea and vomiting), Psychiatric problem, Scoliosis, and Tobacco abuse. She presents for the following: Cough (Ongoing for a while, pt is a current smoker, believe that to be the issue.) and Tingling (Bilateral hands and feet, cold and clammy to touch. Persistent and continuous tingling lately, has been an issue since childhood. )    Patient presents to the clinic with complaints of productive cough, wheezing, numb, cold, and pale fingers. Patient also complains of intermittent shortness of breath. Denies any fever, chest pain, palpitation, lightheadedness. Patient has a 26 pack year history (1 pack cigarettes for 26 years). Pre-contemplation on reduction of smoking. Patient taking Topamax daily with stable management of migraines. Patient mood has been stable with minimal stressor. Patient anxiety is stable and following psychiatry in management. Patient with history of Hypothyroidism. Patient states she was previously managed with levothyroxine, however patient has not been taking medication. Patient recurrent elevated blood pressure reading. Review of Systems   Constitutional: Negative. HENT: Negative. Eyes: Negative. Respiratory:  Positive for shortness of breath. Gastrointestinal: Negative. Endocrine: Negative. Genitourinary: Negative. Musculoskeletal: Negative. Neurological:  Positive for numbness (fingers). Hematological: Negative. Psychiatric/Behavioral: Negative.        Objective
Physician Statement  I have discussed the case, including pertinent history and exam findings with the resident. I also have seen the patient and performed key portions of the examination. I agree with the documented assessment and plan as documented by the resident.         Michelet Denise MD 8/15/2023 12:27 PM
History of tonsillectomy    S/P breast lumpectomy  L breast with lymph node removal  S/P hysterectomy

## 2023-09-06 NOTE — ED ADULT TRIAGE NOTE - PAIN: PRESENCE, MLM
Phone call to [facility] provider services to obtain contact information for CM supervisor    Supervisor name and number: [email or web address] / not able to provide phone number. Attempted phone call to [facility] , Jyothi Larson. No answer. SW sent secure email to 's supervisor, Marisel Pederson to notify her of inability to reach assigned CM to discuss Pt's needs for DME to include lift chair and motorized wheelchair. GIOVANNI plan of care: SW will await return email / call from [facility]  or supervisor. SW will make next outreach attempt on 9/11 to follow up regarding requests for DME. complains of pain/discomfort

## 2024-02-19 NOTE — ED ADULT NURSE NOTE - NS TRANSFER PATIENT BELONGINGS
Spoke with [facility] and sent over new order to [facility] for walker/Rolator. Patient was thankful for the call, it was a mishap in the fax number.   Clothing

## 2024-05-14 NOTE — ED ADULT NURSE NOTE - NSSISCREENINGQ5_ED_A_ED
[FreeTextEntry3] : procedure - bilateral endoscopic nasal debridement Dx - crusting/ polypoid tissue  Verbal consent obtained - discussed risks and benefits of intervention before proceeding  Bilateral nasal cavities inspected with #0 ridged sinus endoscope. septum appeared midline and turbinates well reduced. bilateral middle meatuses were explored and suction and agitator were used to open ostiums and open any forming scar bands. all sinuses were explored and open at end of procedure  No

## 2024-06-12 NOTE — ED PROVIDER NOTE - SKIN NEGATIVE STATEMENT, MLM
no chest pain, no cough, and no shortness of breath. no abrasions, no jaundice, no lesions, no pruritis, and no rashes.

## 2024-08-19 NOTE — ED PROVIDER NOTE - MEDICAL DECISION MAKING DETAILS
Addended by: JEWEL DENNEY on: 8/19/2024 03:15 PM     Modules accepted: Orders    
53F p/w fatigue ?LOC found outside. appears dehydrated. will check labs, rehydrate, reassess.

## 2024-08-22 NOTE — ED ADULT NURSE NOTE - NS ED PATIENT SAFETY CONCERN
[Hygeine (Including Daily Shower)] : hygeine (including daily shower) [Importance of Regular Medical Follow-Up] : the importance of regular medical follow-up [No Heavy Lifting] : no heavy lifting (>15-20 lb. for 1 month or 25 lb. for 3 months from date of surgery) [Blood Pressure Control] : blood pressure control [S/S of infection] : signs and symptoms of infection (and to whom it should be reported) [Progressive Ambulation/Activity] : progressive ambulation/activity [Medication/Vitamin/Herb/Food Interaction] : medication/vitamin/herb/food interaction No